# Patient Record
Sex: MALE | Race: BLACK OR AFRICAN AMERICAN | NOT HISPANIC OR LATINO | URBAN - METROPOLITAN AREA
[De-identification: names, ages, dates, MRNs, and addresses within clinical notes are randomized per-mention and may not be internally consistent; named-entity substitution may affect disease eponyms.]

---

## 2021-06-08 ENCOUNTER — INPATIENT (INPATIENT)
Facility: HOSPITAL | Age: 71
LOS: 4 days | Discharge: LEFT BEFORE DISCHARGE | DRG: 563 | End: 2021-06-13
Attending: STUDENT IN AN ORGANIZED HEALTH CARE EDUCATION/TRAINING PROGRAM | Admitting: HOSPITALIST
Payer: MEDICARE

## 2021-06-08 VITALS
HEART RATE: 92 BPM | TEMPERATURE: 98 F | HEIGHT: 68 IN | RESPIRATION RATE: 16 BRPM | WEIGHT: 169.98 LBS | DIASTOLIC BLOOD PRESSURE: 81 MMHG | SYSTOLIC BLOOD PRESSURE: 154 MMHG | OXYGEN SATURATION: 97 %

## 2021-06-08 DIAGNOSIS — D64.9 ANEMIA, UNSPECIFIED: ICD-10-CM

## 2021-06-08 DIAGNOSIS — I10 ESSENTIAL (PRIMARY) HYPERTENSION: ICD-10-CM

## 2021-06-08 DIAGNOSIS — J45.909 UNSPECIFIED ASTHMA, UNCOMPLICATED: ICD-10-CM

## 2021-06-08 DIAGNOSIS — R63.8 OTHER SYMPTOMS AND SIGNS CONCERNING FOOD AND FLUID INTAKE: ICD-10-CM

## 2021-06-08 DIAGNOSIS — F11.20 OPIOID DEPENDENCE, UNCOMPLICATED: ICD-10-CM

## 2021-06-08 DIAGNOSIS — N39.0 URINARY TRACT INFECTION, SITE NOT SPECIFIED: ICD-10-CM

## 2021-06-08 DIAGNOSIS — S42.309A UNSPECIFIED FRACTURE OF SHAFT OF HUMERUS, UNSPECIFIED ARM, INITIAL ENCOUNTER FOR CLOSED FRACTURE: ICD-10-CM

## 2021-06-08 LAB
ALBUMIN SERPL ELPH-MCNC: 3.8 G/DL — SIGNIFICANT CHANGE UP (ref 3.3–5)
ALP SERPL-CCNC: 98 U/L — SIGNIFICANT CHANGE UP (ref 40–120)
ALT FLD-CCNC: 18 U/L — SIGNIFICANT CHANGE UP (ref 10–45)
ANION GAP SERPL CALC-SCNC: 9 MMOL/L — SIGNIFICANT CHANGE UP (ref 5–17)
APPEARANCE UR: CLEAR — SIGNIFICANT CHANGE UP
APTT BLD: 30.2 SEC — SIGNIFICANT CHANGE UP (ref 27.5–35.5)
AST SERPL-CCNC: 26 U/L — SIGNIFICANT CHANGE UP (ref 10–40)
BACTERIA # UR AUTO: PRESENT /HPF
BASOPHILS # BLD AUTO: 0.03 K/UL — SIGNIFICANT CHANGE UP (ref 0–0.2)
BASOPHILS NFR BLD AUTO: 0.4 % — SIGNIFICANT CHANGE UP (ref 0–2)
BILIRUB SERPL-MCNC: 0.4 MG/DL — SIGNIFICANT CHANGE UP (ref 0.2–1.2)
BILIRUB UR-MCNC: NEGATIVE — SIGNIFICANT CHANGE UP
BLD GP AB SCN SERPL QL: NEGATIVE — SIGNIFICANT CHANGE UP
BUN SERPL-MCNC: 17 MG/DL — SIGNIFICANT CHANGE UP (ref 7–23)
CALCIUM SERPL-MCNC: 9.4 MG/DL — SIGNIFICANT CHANGE UP (ref 8.4–10.5)
CHLORIDE SERPL-SCNC: 100 MMOL/L — SIGNIFICANT CHANGE UP (ref 96–108)
CO2 SERPL-SCNC: 29 MMOL/L — SIGNIFICANT CHANGE UP (ref 22–31)
COLOR SPEC: YELLOW — SIGNIFICANT CHANGE UP
CREAT SERPL-MCNC: 0.78 MG/DL — SIGNIFICANT CHANGE UP (ref 0.5–1.3)
DIFF PNL FLD: ABNORMAL
EOSINOPHIL # BLD AUTO: 0.42 K/UL — SIGNIFICANT CHANGE UP (ref 0–0.5)
EOSINOPHIL NFR BLD AUTO: 5 % — SIGNIFICANT CHANGE UP (ref 0–6)
EPI CELLS # UR: SIGNIFICANT CHANGE UP /HPF (ref 0–5)
GLUCOSE SERPL-MCNC: 105 MG/DL — HIGH (ref 70–99)
GLUCOSE UR QL: NEGATIVE — SIGNIFICANT CHANGE UP
HCT VFR BLD CALC: 36.5 % — LOW (ref 39–50)
HGB BLD-MCNC: 11 G/DL — LOW (ref 13–17)
IMM GRANULOCYTES NFR BLD AUTO: 0.8 % — SIGNIFICANT CHANGE UP (ref 0–1.5)
INR BLD: 1.04 — SIGNIFICANT CHANGE UP (ref 0.88–1.16)
KETONES UR-MCNC: NEGATIVE — SIGNIFICANT CHANGE UP
LEUKOCYTE ESTERASE UR-ACNC: ABNORMAL
LYMPHOCYTES # BLD AUTO: 1.32 K/UL — SIGNIFICANT CHANGE UP (ref 1–3.3)
LYMPHOCYTES # BLD AUTO: 15.8 % — SIGNIFICANT CHANGE UP (ref 13–44)
MCHC RBC-ENTMCNC: 27.2 PG — SIGNIFICANT CHANGE UP (ref 27–34)
MCHC RBC-ENTMCNC: 30.1 GM/DL — LOW (ref 32–36)
MCV RBC AUTO: 90.1 FL — SIGNIFICANT CHANGE UP (ref 80–100)
MONOCYTES # BLD AUTO: 0.61 K/UL — SIGNIFICANT CHANGE UP (ref 0–0.9)
MONOCYTES NFR BLD AUTO: 7.3 % — SIGNIFICANT CHANGE UP (ref 2–14)
NEUTROPHILS # BLD AUTO: 5.89 K/UL — SIGNIFICANT CHANGE UP (ref 1.8–7.4)
NEUTROPHILS NFR BLD AUTO: 70.7 % — SIGNIFICANT CHANGE UP (ref 43–77)
NITRITE UR-MCNC: POSITIVE
NRBC # BLD: 0 /100 WBCS — SIGNIFICANT CHANGE UP (ref 0–0)
NT-PROBNP SERPL-SCNC: 255 PG/ML — SIGNIFICANT CHANGE UP (ref 0–300)
PH UR: 6 — SIGNIFICANT CHANGE UP (ref 5–8)
PLATELET # BLD AUTO: 156 K/UL — SIGNIFICANT CHANGE UP (ref 150–400)
POTASSIUM SERPL-MCNC: 4.2 MMOL/L — SIGNIFICANT CHANGE UP (ref 3.5–5.3)
POTASSIUM SERPL-SCNC: 4.2 MMOL/L — SIGNIFICANT CHANGE UP (ref 3.5–5.3)
PROT SERPL-MCNC: 8.3 G/DL — SIGNIFICANT CHANGE UP (ref 6–8.3)
PROT UR-MCNC: NEGATIVE MG/DL — SIGNIFICANT CHANGE UP
PROTHROM AB SERPL-ACNC: 12.4 SEC — SIGNIFICANT CHANGE UP (ref 10.6–13.6)
RBC # BLD: 4.05 M/UL — LOW (ref 4.2–5.8)
RBC # FLD: 14 % — SIGNIFICANT CHANGE UP (ref 10.3–14.5)
RBC CASTS # UR COMP ASSIST: < 5 /HPF — SIGNIFICANT CHANGE UP
RH IG SCN BLD-IMP: POSITIVE — SIGNIFICANT CHANGE UP
SARS-COV-2 RNA SPEC QL NAA+PROBE: SIGNIFICANT CHANGE UP
SODIUM SERPL-SCNC: 138 MMOL/L — SIGNIFICANT CHANGE UP (ref 135–145)
SP GR SPEC: 1.02 — SIGNIFICANT CHANGE UP (ref 1–1.03)
UROBILINOGEN FLD QL: 0.2 E.U./DL — SIGNIFICANT CHANGE UP
WBC # BLD: 8.34 K/UL — SIGNIFICANT CHANGE UP (ref 3.8–10.5)
WBC # FLD AUTO: 8.34 K/UL — SIGNIFICANT CHANGE UP (ref 3.8–10.5)
WBC UR QL: > 10 /HPF

## 2021-06-08 PROCEDURE — 73030 X-RAY EXAM OF SHOULDER: CPT | Mod: 26

## 2021-06-08 PROCEDURE — 73110 X-RAY EXAM OF WRIST: CPT | Mod: 26,LT

## 2021-06-08 PROCEDURE — 73030 X-RAY EXAM OF SHOULDER: CPT | Mod: 26,LT

## 2021-06-08 PROCEDURE — 73090 X-RAY EXAM OF FOREARM: CPT | Mod: 26

## 2021-06-08 PROCEDURE — 73080 X-RAY EXAM OF ELBOW: CPT | Mod: 26,LT

## 2021-06-08 PROCEDURE — 93306 TTE W/DOPPLER COMPLETE: CPT | Mod: 26

## 2021-06-08 PROCEDURE — 73080 X-RAY EXAM OF ELBOW: CPT | Mod: 26

## 2021-06-08 PROCEDURE — 71045 X-RAY EXAM CHEST 1 VIEW: CPT | Mod: 26

## 2021-06-08 PROCEDURE — 99223 1ST HOSP IP/OBS HIGH 75: CPT | Mod: GC

## 2021-06-08 PROCEDURE — 73090 X-RAY EXAM OF FOREARM: CPT | Mod: 26,LT

## 2021-06-08 PROCEDURE — 99285 EMERGENCY DEPT VISIT HI MDM: CPT | Mod: 25

## 2021-06-08 PROCEDURE — 73060 X-RAY EXAM OF HUMERUS: CPT | Mod: 26,LT,76

## 2021-06-08 PROCEDURE — 73060 X-RAY EXAM OF HUMERUS: CPT | Mod: 26

## 2021-06-08 RX ORDER — ACETAMINOPHEN 500 MG
650 TABLET ORAL EVERY 6 HOURS
Refills: 0 | Status: DISCONTINUED | OUTPATIENT
Start: 2021-06-08 | End: 2021-06-13

## 2021-06-08 RX ORDER — TAMSULOSIN HYDROCHLORIDE 0.4 MG/1
0.4 CAPSULE ORAL EVERY 12 HOURS
Refills: 0 | Status: DISCONTINUED | OUTPATIENT
Start: 2021-06-08 | End: 2021-06-13

## 2021-06-08 RX ORDER — ENOXAPARIN SODIUM 100 MG/ML
40 INJECTION SUBCUTANEOUS EVERY 24 HOURS
Refills: 0 | Status: DISCONTINUED | OUTPATIENT
Start: 2021-06-08 | End: 2021-06-13

## 2021-06-08 RX ORDER — SENNA PLUS 8.6 MG/1
2 TABLET ORAL AT BEDTIME
Refills: 0 | Status: DISCONTINUED | OUTPATIENT
Start: 2021-06-08 | End: 2021-06-13

## 2021-06-08 RX ORDER — METHADONE HYDROCHLORIDE 40 MG/1
180 TABLET ORAL EVERY 24 HOURS
Refills: 0 | Status: DISCONTINUED | OUTPATIENT
Start: 2021-06-08 | End: 2021-06-13

## 2021-06-08 RX ORDER — HYDROCHLOROTHIAZIDE 25 MG
25 TABLET ORAL EVERY 24 HOURS
Refills: 0 | Status: DISCONTINUED | OUTPATIENT
Start: 2021-06-08 | End: 2021-06-11

## 2021-06-08 RX ORDER — HYDROMORPHONE HYDROCHLORIDE 2 MG/ML
1 INJECTION INTRAMUSCULAR; INTRAVENOUS; SUBCUTANEOUS ONCE
Refills: 0 | Status: DISCONTINUED | OUTPATIENT
Start: 2021-06-08 | End: 2021-06-08

## 2021-06-08 RX ORDER — MORPHINE SULFATE 50 MG/1
4 CAPSULE, EXTENDED RELEASE ORAL ONCE
Refills: 0 | Status: DISCONTINUED | OUTPATIENT
Start: 2021-06-08 | End: 2021-06-08

## 2021-06-08 RX ORDER — CEFTRIAXONE 500 MG/1
1000 INJECTION, POWDER, FOR SOLUTION INTRAMUSCULAR; INTRAVENOUS EVERY 24 HOURS
Refills: 0 | Status: DISCONTINUED | OUTPATIENT
Start: 2021-06-08 | End: 2021-06-09

## 2021-06-08 RX ADMIN — MORPHINE SULFATE 4 MILLIGRAM(S): 50 CAPSULE, EXTENDED RELEASE ORAL at 00:48

## 2021-06-08 RX ADMIN — Medication 650 MILLIGRAM(S): at 17:27

## 2021-06-08 RX ADMIN — CEFTRIAXONE 100 MILLIGRAM(S): 500 INJECTION, POWDER, FOR SOLUTION INTRAMUSCULAR; INTRAVENOUS at 15:35

## 2021-06-08 RX ADMIN — Medication 1 TABLET(S): at 04:38

## 2021-06-08 RX ADMIN — Medication 650 MILLIGRAM(S): at 18:15

## 2021-06-08 RX ADMIN — ENOXAPARIN SODIUM 40 MILLIGRAM(S): 100 INJECTION SUBCUTANEOUS at 09:25

## 2021-06-08 RX ADMIN — MORPHINE SULFATE 4 MILLIGRAM(S): 50 CAPSULE, EXTENDED RELEASE ORAL at 01:03

## 2021-06-08 RX ADMIN — HYDROMORPHONE HYDROCHLORIDE 1 MILLIGRAM(S): 2 INJECTION INTRAMUSCULAR; INTRAVENOUS; SUBCUTANEOUS at 02:05

## 2021-06-08 RX ADMIN — METHADONE HYDROCHLORIDE 180 MILLIGRAM(S): 40 TABLET ORAL at 09:26

## 2021-06-08 RX ADMIN — TAMSULOSIN HYDROCHLORIDE 0.4 MILLIGRAM(S): 0.4 CAPSULE ORAL at 17:27

## 2021-06-08 RX ADMIN — TAMSULOSIN HYDROCHLORIDE 0.4 MILLIGRAM(S): 0.4 CAPSULE ORAL at 10:38

## 2021-06-08 RX ADMIN — HYDROMORPHONE HYDROCHLORIDE 1 MILLIGRAM(S): 2 INJECTION INTRAMUSCULAR; INTRAVENOUS; SUBCUTANEOUS at 02:20

## 2021-06-08 RX ADMIN — Medication 25 MILLIGRAM(S): at 10:38

## 2021-06-08 RX ADMIN — SENNA PLUS 2 TABLET(S): 8.6 TABLET ORAL at 21:20

## 2021-06-08 NOTE — CONSULT NOTE ADULT - SUBJECTIVE AND OBJECTIVE BOX
Orthopaedic Surgery Consult Note    For Surgeon: Anika    HPI:  Patient is a 71y old Male who presents with a chief complaint of L arm pain after fall in the shower. States he slipped and fell backwards onto the outstretched arm and experienced immediate deformity and pain. Does not relay history of prior L arm trauma but distal humerus hardware present on imaging. Endorses R shoulder dislocation in the past but at present no complaints RUE. States entire L arm is painful from shoulder to hand. Does not recall head trauma, LOC. Denies numbness, tingling RUE.      Allergies    penicillin (Unknown)    Intolerances      PAST MEDICAL & SURGICAL HISTORY:    MEDICATIONS  (STANDING):    MEDICATIONS  (PRN):      Vital Signs Last 24 Hrs  T(C): 36.9 (08 Jun 2021 00:34), Max: 36.9 (08 Jun 2021 00:34)  T(F): 98.5 (08 Jun 2021 00:34), Max: 98.5 (08 Jun 2021 00:34)  HR: 92 (08 Jun 2021 00:34) (92 - 92)  BP: 154/81 (08 Jun 2021 00:34) (154/81 - 154/81)  BP(mean): --  RR: 16 (08 Jun 2021 00:34) (16 - 16)  SpO2: 97% (08 Jun 2021 00:34) (97% - 97%)    Physical Exam:  General: Resting in stretcher, appears agitated, demanding urinal  LUE: Gross deformity of L arm. Arm held at side in flexion and refusing to move elbow or wrist 2/2 pain. TTP diffusely throughout shoulder, arm, elbow, forearm, wrist. SILT throughout. Finger extension, EIP intact. AIN,U motor intact. Radial pulse 2+                          11.0   8.34  )-----------( 156      ( 08 Jun 2021 01:14 )             36.5     06-08    138  |  100  |  17  ----------------------------<  105<H>  4.2   |  29  |  0.78    Ca    9.4      08 Jun 2021 01:14    TPro  8.3  /  Alb  3.8  /  TBili  0.4  /  DBili  x   /  AST  26  /  ALT  18  /  AlkPhos  98  06-08    PT/INR - ( 08 Jun 2021 01:14 )   PT: 12.4 sec;   INR: 1.04          PTT - ( 08 Jun 2021 01:14 )  PTT:30.2 sec  Imaging:     A/P: 71yMale with L humeral shaft fracture s/p fall. No other fractures appreciated on imaging.  - Pain control   - Coaptation splint placed  - NVID pre and post splinting  - Post splint XRs  - Pending discussion w/ Dr. Donaldson    Ortho Pager [phone]   Orthopaedic Surgery Consult Note    For Surgeon: Anika    HPI:  Patient is a 71y old Male who presents with a chief complaint of L arm pain after fall in the shower. States he slipped and fell backwards onto the outstretched arm and experienced immediate deformity and pain. Does not relay history of prior L arm trauma but distal humerus hardware present on imaging. Endorses R shoulder dislocation in the past but at present no complaints RUE. States entire L arm is painful from shoulder to hand. Does not recall head trauma, LOC. Denies numbness, tingling RUE.      Allergies    penicillin (Unknown)    Intolerances      PAST MEDICAL & SURGICAL HISTORY:    MEDICATIONS  (STANDING):    MEDICATIONS  (PRN):      Vital Signs Last 24 Hrs  T(C): 36.9 (08 Jun 2021 00:34), Max: 36.9 (08 Jun 2021 00:34)  T(F): 98.5 (08 Jun 2021 00:34), Max: 98.5 (08 Jun 2021 00:34)  HR: 92 (08 Jun 2021 00:34) (92 - 92)  BP: 154/81 (08 Jun 2021 00:34) (154/81 - 154/81)  BP(mean): --  RR: 16 (08 Jun 2021 00:34) (16 - 16)  SpO2: 97% (08 Jun 2021 00:34) (97% - 97%)    Physical Exam:  General: Resting in stretcher, appears agitated, demanding urinal  LUE: Gross deformity of L arm. Arm held at side in flexion and refusing to move elbow or wrist 2/2 pain. TTP diffusely throughout shoulder, arm, elbow, forearm, wrist. SILT throughout. Finger extension, EIP intact. AIN,U motor intact. Radial pulse 2+                          11.0   8.34  )-----------( 156      ( 08 Jun 2021 01:14 )             36.5     06-08    138  |  100  |  17  ----------------------------<  105<H>  4.2   |  29  |  0.78    Ca    9.4      08 Jun 2021 01:14    TPro  8.3  /  Alb  3.8  /  TBili  0.4  /  DBili  x   /  AST  26  /  ALT  18  /  AlkPhos  98  06-08    PT/INR - ( 08 Jun 2021 01:14 )   PT: 12.4 sec;   INR: 1.04          PTT - ( 08 Jun 2021 01:14 )  PTT:30.2 sec  Imaging:     A/P: 71yMale with L humeral shaft fracture s/p fall. No other fractures appreciated on imaging. Plan for conservative management with coaptation splint and later transition to oneil/functional brace outpatient. No acute operative intervention planned.  - Pain control   - Coaptation splint placed  - NVID pre and post splinting  - Post splint XRs reviewed; fx with acceptable alignment  - Return to ED for new/worsening pain, numbness/tingling/weakness LUE  - F/u outpatient Dr. Donaldson    Ortho Pager [phone]

## 2021-06-08 NOTE — H&P ADULT - PROBLEM SELECTOR PLAN 1
Angulated and displaced comminuted fracture after fall on outstretched hand in the shower on June 7th. Imaging noted for said finding along with greater humeral tuberosity peritendonitis, bankart lesion, distal humeral/condylar fixation plates and screws. Left elbow also with soft tissue swelling but no acute fracture.   - ortho team attempted partial reduction. Per their impression, plan for conservative management with coaptation splint and later transition to oneil/functional brace as an outpatient.   - no acute operative intervention planned  - Per ortho, post splint x-rays reviewd and fracture with acceptable alignment  - currently denies any numbness, tingling, and weakness of LUE  - PRN tylenol (mild)  - PT eval   - f/u outpatient with Dr. Donaldson Angulated and displaced comminuted fracture after fall on outstretched hand in the shower on June 7th. Imaging noted for said finding along with greater humeral tuberosity peritendonitis, bankart lesion, distal humeral/condylar fixation plates and screws. Left elbow also with soft tissue swelling but no acute fracture.   - ortho team attempted partial reduction. Per their impression, plan for conservative management with coaptation splint and later transition to oneil/functional brace as an outpatient.   - no acute operative intervention planned  - Per ortho, post splint x-rays reviewed and fracture with acceptable alignment  - currently denies any numbness, tingling, and weakness of LUE  - PRN tylenol (mild), conservative hold off opioids given he is on methadone  - ice packs   - PT eval   - f/u outpatient with Dr. Donaldson

## 2021-06-08 NOTE — DISCHARGE NOTE PROVIDER - NPI NUMBER (FOR SYSADMIN USE ONLY) :
[[id number]] [[id number]],[UNKNOWN] [[id number]],[UNKNOWN],[[id number]] [[id number]],[UNKNOWN],[[id number]]

## 2021-06-08 NOTE — H&P ADULT - PROBLEM SELECTOR PLAN 8
F: tolerating PO, no IVF  E: replete K<4, Mg<2  N: Dash/TLC  VTE Prophylaxis: Lovenox 40 Q24H  GI: not needed  C: Full Code  D: RMF

## 2021-06-08 NOTE — H&P ADULT - PROBLEM SELECTOR PLAN 5
History of childhood asthma for which he used to take ventolin inhaler as needed and nasal drops. Has not used rescue inhaler within the past year.   - continue to monitor Workup notable for cardiomegaly with pulmonary vascular congestion and bilateral 2+ pitting edema from the feet to mid-calves. Patient unsure of history of heart failure.   - will obtain collateral from wife  - f/u TTE

## 2021-06-08 NOTE — DISCHARGE NOTE PROVIDER - NSDCFUADDAPPT_GEN_ALL_CORE_FT
Please bring your Insurance card, Photo ID and Discharge paperwork to the following appointment:    (1) Please follow up with your Orthopaedic Surgery Provider, Dr. Matt Donaldson at [address] [place] [address] on 06/16/2021 at 1:15pm.    Appointment was scheduled by Ms. ADRIANA Christie, Referral Coordinator.   Please bring your Insurance card, Photo ID and Discharge paperwork to the following appointments:    (1) Please follow up with your Orthopaedic Surgery Provider, Dr. Matt Donaldson at [address] [place] [address] on 06/16/2021 at 1:15pm.    Appointment was scheduled by Ms. ADRIANA Christie, Referral Coordinator.    (2) As per your Primary Care Provider, Ale Franklin, please call the office to schedule an appointment directly with the office or you can do a walk-in as early as one day following your hospital discharge.    Appointment was facilitated by Ms. ADRIANA Christie, Referral Coordinator.   Please bring your Insurance card, Photo ID and Discharge paperwork to the following appointments:    (1) Please follow up with your Orthopaedic Surgery Provider, Dr. Matt Donaldson at [address] [place] [address] on 06/16/2021 at 1:15pm.    Appointment was scheduled by Ms. ADRIANA Christie, Referral Coordinator.    (2) As per your Primary Care Provider, Ale Franklin, please call the office to schedule an appointment directly with the office or you can do a walk-in as early as one day following your hospital discharge. The office is aware of your hospital stay and is expecting you.    Appointment was facilitated by Ms. ADRIANA Christie, Referral Coordinator.   Please bring your Insurance card, Photo ID and Discharge paperwork to the following appointments:    (1) Please follow up with your Orthopaedic Surgery Provider, Dr. Matt Donaldson at [address] [place] [address] on 06/16/2021 at 1:15pm.    Appointment was scheduled by Ms. ADRIANA Christie, Referral Coordinator.    (2) Please follow up with your Cardiology Provider, Nurse Practitioner, Raisa Esteban at [address] [place] [address] on 06/18/2021 at 11:00am.    Appointment was scheduled by Ms. ADRIANA Christie, Referral Coordinator.    (3) As per your Primary Care Provider, Ale Franklin, please call the office to schedule an appointment directly with the office or you can do a walk-in as early as one day following your hospital discharge. The office is aware of your hospital stay and is expecting you.    Appointment was facilitated by Ms. ADRIANA Christie, Referral Coordinator.

## 2021-06-08 NOTE — DISCHARGE NOTE PROVIDER - CARE PROVIDERS DIRECT ADDRESSES
[email or web address],DirectAddress_Unknown [email or web address],DirectAddress_Unknown,DirectAddress_Unknown

## 2021-06-08 NOTE — DISCHARGE NOTE PROVIDER - CARE PROVIDER_API CALL
Matt Donaldson)  Orthopaedic Surgery Surgery  [address] [place] [address]  Phone: [phone]  Fax: [phone]  Follow Up Time: 2 weeks   Matt Donaldson)  Orthopaedic Surgery Surgery  [address] [place] [address]  Phone: [phone]  Fax: [phone]  Scheduled Appointment: 06/16/2021 01:15 PM   Matt Donaldson)  Orthopaedic Surgery Surgery  [address] [address]  Phone: [phone]  Fax: [phone]  Scheduled Appointment: 06/16/2021 01:15 PM    DAGOBERTO Franklin, [facility] [place]  [address] [place] [address], [address]  Phone: [phone]  Fax: (   )    -  Follow Up Time: 2 weeks   Matt Donaldson)  Orthopaedic Surgery Surgery  [address] [address]  Phone: [phone]  Fax: [phone]  Scheduled Appointment: 06/16/2021 01:15 PM    DAGOBERTO Franklin, [facility] [place]  [address] [place] [address], [address]  Phone: [phone]  Fax: (   )    -  Follow Up Time: 2 weeks    NIC NIELSON  Cardiology  Phone: [phone]  Fax: [phone]  Follow Up Time: 2 weeks   Matt Donaldson)  Orthopaedic Surgery Surgery  [address] [address]  Phone: [phone]  Fax: [phone]  Scheduled Appointment: 06/16/2021 01:15 PM    DAGOBERTO Franklin, [facility] [place]  [address], [address]  Phone: [phone]  Fax: (   )    -  Follow Up Time: 2 weeks    Raisa Esteban (NP; RN)  NP in Family Health  [address] [place] [address]  Phone: [phone]  Fax: [phone]  Scheduled Appointment: 06/18/2021 11:00 AM

## 2021-06-08 NOTE — PHYSICAL THERAPY INITIAL EVALUATION ADULT - GENERAL OBSERVATIONS, REHAB EVAL
As per Rn lissa patient cleared for PT/OOB. Received supine + heplock, splint with ace wrap left UE with sling in place, in NAD.

## 2021-06-08 NOTE — H&P ADULT - HISTORY OF PRESENT ILLNESS
71 year old Male with PMHx HTN, childhood asthma, methadone dependence who presents with a chief complaint of L arm pain after fall in the shower. States he was at his sister-in-law's house and used her bathtub around 6-7pm yesterday. There was no railing for support and slipped while showering. Says he fell backwards with his left hand outstretched and experienced immediate 10/10 pain and noticed left upper extremity deformity. Unsure if he hit his head, but denied any loss of consciousness. His wife heard him in pain and entered the bathroom after which EMS was called. Patient possibly a poor historian given that he denied any left arm trauma, but noted to have distal humerus hardware on admission imaging. Does admit to right shoulder dislocation in the distant past that was reduced. At this moment, his LUE pain is 9/10 and diffuse but denies any numbness or tingling. Currently denies any other MSK related symptoms aside from the left upper extremity. Of note, he denies any dysuria or urethral discharge but admits to polyuria for the past one week. On further ROS, denies fevers, chills, N/V/D/C, CP, SOB, abdominal pain, leg swelling.     ED Course  Vitals: 98.5F (oral), HR 92, /81, 97% on RA, RR 16  Labs: notable for Hgb 11.0/Hct 36.5; UA - positive nitrites and small LE, trace blood, >10 WBC, +bacteria;   Imagin) XR humerus and shoulder, left - Humeral shaft angulated displaced comminuted fracture. Chronic rotator cuff injury. Shoulder and AC joint degenerative changes, greater humeral tuberosity peritendinitis. Bankart lesion. Distal humeral/condylar fixation plates and screws  2) XR forearm, left - Elbow soft tissue swelling. No acute fracture.. Wrist and elbow degenerative arthritis. Distal humeral/condylar fixation plates and screws  3) CXR - Cardiomegaly. Pulmonary vascular congestion. Scoliosis Left humeral shaft fracture, fixation plates and screws. Bilateral shoulder degenerative changes. Partially visualized right humeral neck old traumatic changes.  Management: dilaudid 1mg IVP x1, morphine 4mg IVP x1, bactrim DS PO x1 71 year old Male with PMHx HTN, childhood asthma, methadone dependence who presents with a chief complaint of L arm pain after fall in the shower. States he was at his sister-in-law's house and used her bathtub around 6-7pm yesterday. There was no railing for support and slipped while showering. Says he fell backwards with his left hand outstretched and experienced immediate 10/10 pain and noticed left upper extremity deformity. Unsure if he hit his head, but denied any loss of consciousness. His wife heard him in pain and entered the bathroom after which EMS was called. Patient possibly a poor historian given that he denied any left arm trauma, but noted to have distal humerus hardware on admission imaging. Does admit to right shoulder dislocation in the distant past that was reduced. At this moment, his LUE pain is 9/10 and diffuse but denies any numbness or tingling. Currently denies any other MSK related symptoms aside from the left upper extremity. Of note, he denies any dysuria or urethral discharge but admits to polyuria for the past one week. On further ROS, denies fevers, chills, N/V/D/C, CP, SOB, abdominal pain, leg swelling (although exam notable for swelling).    ED Course  Vitals: 98.5F (oral), HR 92, /81, 97% on RA, RR 16  Labs: notable for Hgb 11.0/Hct 36.5; UA - positive nitrites and small LE, trace blood, >10 WBC, +bacteria;   Imagin) XR humerus and shoulder, left - Humeral shaft angulated displaced comminuted fracture. Chronic rotator cuff injury. Shoulder and AC joint degenerative changes, greater humeral tuberosity peritendinitis. Bankart lesion. Distal humeral/condylar fixation plates and screws  2) XR forearm, left - Elbow soft tissue swelling. No acute fracture.. Wrist and elbow degenerative arthritis. Distal humeral/condylar fixation plates and screws  3) CXR - Cardiomegaly. Pulmonary vascular congestion. Scoliosis Left humeral shaft fracture, fixation plates and screws. Bilateral shoulder degenerative changes. Partially visualized right humeral neck old traumatic changes.  Management: dilaudid 1mg IVP x1, morphine 4mg IVP x1, bactrim DS PO x1

## 2021-06-08 NOTE — H&P ADULT - ATTENDING COMMENTS
Patient discussed with resident team and plan of care reviewed. I have personally reviewed all pertinent labs and imaging and performed an independent history and physical. Resident note personally reviewed, and I agree with above resident note with the following additions:    71YOM with history of essential HTN, asthma, chronic opiate dependence on methadone admitted after mechanical fall leading to left humeral fracture; also noted on admission to have UTI with symptoms of urinary frequency. CXR on admission also notable for cardiomegaly and pulmonary vascular congestion - no known diagnosis of heart failure, and not on any heart failure medications.     Continue pain medications (maximize non-opiates if pain poorly controlled, given on methadone), will have PT see patient and give ceftriaxone for UTI. Add on BNP and TTE to evaluate for heart failure.

## 2021-06-08 NOTE — PHYSICAL THERAPY INITIAL EVALUATION ADULT - ACTIVE RANGE OF MOTION EXAMINATION, REHAB EVAL
except decreased shoulder flexion. Left shoulder/elbow NT due to fracture/splint. Left hand WFL/Right UE Active ROM was WNL (within normal limits)/bilateral  lower extremity Active ROM was WFL (within functional limits)

## 2021-06-08 NOTE — PHYSICAL THERAPY INITIAL EVALUATION ADULT - MANUAL MUSCLE TESTING RESULTS, REHAB EVAL
Right UE 5/5 except right shoulder flexion 3-/5. Left shoulder/elbow NT due to fracture. Left  strength grossly 4/5. Both LE at least 3+/5 throughout

## 2021-06-08 NOTE — DISCHARGE NOTE PROVIDER - HOSPITAL COURSE
#Discharge: do not delete    71 year old Male with PMHx HTN and childhood asthma who presents with a chief complaint of L arm pain after fall in the shower. Found to have a left humeral angulated and diplaced comminuted fracture. Now s/p partial reduction by orthopedic surgery team with plan for conservative management pending HEVER placement.     Problem List/Main Diagnoses (system-based):     #L humerus fracture     #UTI    #Diastolic HF    Inpatient treatment course:   New medications:   Labs to be followed outpatient:   Exam to be followed outpatient:

## 2021-06-08 NOTE — ED PROVIDER NOTE - CARE PLAN
Principal Discharge DX:	Other closed displaced fracture of distal end of left humerus, initial encounter  Secondary Diagnosis:	UTI (urinary tract infection)

## 2021-06-08 NOTE — H&P ADULT - NSHPPHYSICALEXAM_GEN_ALL_CORE
Constitutional: NAD, resting comfortably in bed  Head: NC/AT  Eyes: PERRL, EOMI, anicteric sclera  ENT: no nasal discharge; uvula midline, no oropharyngeal erythema or exudates; slightly dry mucus membranes  Neck: supple; no JVD   Respiratory: CTA B/L; no W/R/R  Cardiac: RRR, +S1, S2, no murmurs noted; PMI displaced   Gastrointestinal: soft, NT/ND; no rebound or guarding; +BSx4  Genitourinary: +suprapubic tenderness to deep palpation  Extremities: WWP, no clubbing or cyanosis; 2+ bilateral pitting edema from feet to mid-calves,   Musculoskeletal: NROM x4; LUE wrapped in cast and sling, partially in flexion, refusing to move secondary to pain. Able to flex and extend fingers without numbness or tingling, distal pulses palpable.    Vascular: 2+ radial, femoral, DP/PT pulses B/L  Dermatologic: skin warm, crusted at the feet; no rashes, wounds, or scars  Neurologic: AAOx3; CNII-XII grossly intact; no focal deficits  Psychiatric: affect and characteristics of appearance, verbalizations, behaviors are appropriate

## 2021-06-08 NOTE — DISCHARGE NOTE PROVIDER - NSDCCPCAREPLAN_GEN_ALL_CORE_FT
PRINCIPAL DISCHARGE DIAGNOSIS  Diagnosis: Other closed displaced fracture of distal end of left humerus, initial encounter  Assessment and Plan of Treatment:       SECONDARY DISCHARGE DIAGNOSES  Diagnosis: UTI (urinary tract infection)  Assessment and Plan of Treatment:     Diagnosis: Heart failure  Assessment and Plan of Treatment:

## 2021-06-08 NOTE — H&P ADULT - NSHPLABSRESULTS_GEN_ALL_CORE
.  LABS                        11.0   8.34  )-----------( 156      ( 2021 01:14 )             36.5     06-08    138  |  100  |  17  ----------------------------<  105<H>  4.2   |  29  |  0.78    Ca    9.4      2021 01:14    TPro  8.3  /  Alb  3.8  /  TBili  0.4  /  DBili  x   /  AST  26  /  ALT  18  /  AlkPhos  98  06-08    PT/INR - ( 2021 01:14 )   PT: 12.4 sec;   INR: 1.04          PTT - ( 2021 01:14 )  PTT:30.2 sec  Urinalysis Basic - ( 2021 03:34 )    Color: Yellow / Appearance: Clear / S.025 / pH: x  Gluc: x / Ketone: NEGATIVE  / Bili: Negative / Urobili: 0.2 E.U./dL   Blood: x / Protein: NEGATIVE mg/dL / Nitrite: POSITIVE   Leuk Esterase: Small / RBC: < 5 /HPF / WBC > 10 /HPF   Sq Epi: x / Non Sq Epi: 0-5 /HPF / Bacteria: Present /HPF            RADIOLOGY & ADDITIONAL TESTS: Reviewed

## 2021-06-08 NOTE — ED ADULT NURSE NOTE - NSIMPLEMENTINTERV_GEN_ALL_ED
Implemented All Fall Risk Interventions:  [place] to call system. Call bell, personal items and telephone within reach. Instruct patient to call for assistance. Room bathroom lighting operational. Non-slip footwear when patient is off stretcher. Physically safe environment: no spills, clutter or unnecessary equipment. Stretcher in lowest position, wheels locked, appropriate side rails in place. Provide visual cue, wrist band, yellow gown, etc. Monitor gait and stability. Monitor for mental status changes and reorient to person, place, and time. Review medications for side effects contributing to fall risk. Reinforce activity limits and safety measures with patient and family.

## 2021-06-08 NOTE — ED PROVIDER NOTE - SHIFT CHANGE DETAILS
Left two messages, patient did not return call.  
Lmtc, 1/18/19    Spouse left a VM earlier stating that she has had nausea for one week.   
She didn't call back last week. Her  said that she was having nausea, she didn't mention in the office visit did she?  
She has had nausea in the past but did not mention it in the past can we call her today and investigate a little further  
lmtc 1/22/19  
71M prior L elbow ORIF c/o L disal arm pain/deformity s/p mechanical fall. found to have acute displaced L distal perihardware humerus fx on xray. pt w/ full capacity refusing ct head. ortho consulted.    dispo: pending ortho reccs

## 2021-06-08 NOTE — ED PROVIDER NOTE - OBJECTIVE STATEMENT
70 y/o M pt with PMHx of asthma, HTN, and on a Methadone program x 15yrs presents to ED s/p slip and fall in the shower tower. Pt states his L hand wound up behind his back and he fell onto his back. He currently relates pain from his L shoulder radiating down his L arm. Pt relates hitting his head, but denies LOC, neck pain, or any other complaints. Pt is not on any blood thinners. Pt is R handed. 70 y/o M pt with PMHx of asthma, HTN, and on a Methadone program x 15yrs presents to ED s/p slip and fall in the shower tonight. Pt states his L hand wound up behind his back and he fell onto his back. He currently relates pain from his L shoulder radiating down his L arm. Pt relates hitting his head, but denies LOC, neck pain, or any other complaints. Pt is not on any blood thinners. Pt is R handed. Denies ha, dizziness, n/v, numbness, tingling.

## 2021-06-08 NOTE — H&P ADULT - PROBLEM SELECTOR PLAN 4
Says he is on methadone 180mg for which he receives his dose at the [facility] on [address].  - will call clinic in AM Says he is on methadone 180mg for which he receives his dose at the [facility] on [address].  - Confirmed dose with clinic (tel: [phone])

## 2021-06-08 NOTE — H&P ADULT - PROBLEM SELECTOR PLAN 6
Hgb 11.0/Hct 36.5 on admission with unclear baseline. Denies any s/s bleeding including hemoptysis, hematuria, dark tarry stools, melena.   - active T&S  - transfuse for Hgb < 7 History of childhood asthma for which he used to take ventolin inhaler as needed and nasal drops. Has not used rescue inhaler within the past year.   - continue to monitor

## 2021-06-08 NOTE — ED ADULT TRIAGE NOTE - CHIEF COMPLAINT QUOTE
Pt presents via EMS for evaluation of left arm pain and deformity to LUE s/p slip and fall in shower PTA. Pt arrives with EMS sling in place, limited ROM, +deformity.

## 2021-06-08 NOTE — H&P ADULT - PROBLEM SELECTOR PLAN 7
F: tolerating PO, no IVF  E: replete K<4, Mg<2  N: Dash/TLC  VTE Prophylaxis: Lovenox 40 Q24H  GI: not needed  C: Full Code  D: RMF Hgb 11.0/Hct 36.5 on admission with unclear baseline. Denies any s/s bleeding including hemoptysis, hematuria, dark tarry stools, melena.   - active T&S  - transfuse for Hgb < 7

## 2021-06-08 NOTE — H&P ADULT - NSHPSOCIALHISTORY_GEN_ALL_CORE
Ambulates with a cane, lives in the [place] with his wife, unemployed. Drinks 2-3 alcoholic beverages per week. Denies recreational drug use. Denies alcohol consumption. Establishes acare at a clin in [address] [place] [address]. Ambulates with a cane, lives in the [place] with his wife, unemployed. Drinks 2-3 alcoholic beverages per week. Denies recreational drug use. Denies alcohol consumption. Establishes a care at a [facility] in [address] [place] [address].

## 2021-06-08 NOTE — PHYSICAL THERAPY INITIAL EVALUATION ADULT - PASSIVE RANGE OF MOTION EXAMINATION, REHAB EVAL
Left shoulder/elbow nT due to fracutre. Left hand WFL/Right UE Passive ROM was WNL (within normal limits)/bilateral lower extremity Passive ROM was WNL

## 2021-06-08 NOTE — ED ADULT NURSE REASSESSMENT NOTE - NS ED NURSE REASSESS COMMENT FT1
DAGOBERTO Woody at bedside for evaluation
Ortho at bedside
called XRT via [employer] to obtain additional XR, as ordered
pt. back from XR, urine sample was obtained and sent, resting quietly, nad
pt. back from XR, without incident or change, pharmacy updated, awaiting dispo.
pt. back from radiology, per CT tech, pt. refused CT head, DAGOBERTO Woody aware, and back at bedside speaking with pt.
pt. resting quietly, nad, d/w provider, states awaiting ortho to look at final film for dispo.
pt. resting quietly, resps. even/unlabored, nad, assessment on-going, pending recs. per ortho
pt. medicated for continued pain as noted, ortho remains at bedside, for splinting, assessment on-going
pt. was medicated as noted, kane. well, assessment on-going, to radiology, accompanied by SSA

## 2021-06-08 NOTE — H&P ADULT - PROBLEM SELECTOR PLAN 3
BP on admission 154/81. Possibly elevated from either underlying diagnosis and/or pain   - med rec in AM (receives medications from [address]) BP on admission 154/81. Possibly elevated from either underlying diagnosis and/or pain   - Per med rec, takes procardia XL 60 mg daily  - Given peripheral edema will dose for HCTZ 25 mg PO daily for now    #BPH  - c/w home tamsulosin 0.4mg q12h

## 2021-06-08 NOTE — PHYSICAL THERAPY INITIAL EVALUATION ADULT - PERTINENT HX OF CURRENT PROBLEM, REHAB EVAL
71 year old Male with PMHx HTN, childhood asthma, methadone dependence who presents with a chief complaint of L arm pain after fall in the shower resulitng in left humerus fracture

## 2021-06-08 NOTE — ED PROVIDER NOTE - CLINICAL SUMMARY MEDICAL DECISION MAKING FREE TEXT BOX
72 y/o M pt with PMHx of asthma and HTN presents to ED with L shoulder and elbow pain s/p slip and fall in the shower today. Positive head strike, but no LOC, and pt not on any blood thinners. On exam, pt with obvious deformity to L elbow with swelling and tenderness, and tenderness to L shoulder, but sensations intact. Will XR L shoulder, humerus, elbow, and forearm, CT head, and give pain control.

## 2021-06-08 NOTE — H&P ADULT - NSICDXFAMHXPERTINENTNEGATIVE_GEN_A_CORE_FT
cancer, coronary disease, diabetes. Patient reports mother had heart valve issues and  during surgery for heart valve.

## 2021-06-08 NOTE — DISCHARGE NOTE PROVIDER - PROVIDER TOKENS
PROVIDER:[TOKEN:[[id number]],FOLLOWUP:[2 weeks]] PROVIDER:[TOKEN:[[id number]],SCHEDULEDAPPT:[06/16/2021],SCHEDULEDAPPTTIME:[01:15 PM]] PROVIDER:[TOKEN:[[id number]],SCHEDULEDAPPT:[06/16/2021],SCHEDULEDAPPTTIME:[01:15 PM]],FREE:[LAST:[DAGOBERTO Franklin],FIRST:[Ale],PHONE:[phone]],FAX:[(   )    -],ADDRESS:[[facility] at [address], [facility]],FOLLOWUP:[2 weeks]] PROVIDER:[TOKEN:[[id number]],SCHEDULEDAPPT:[06/16/2021],SCHEDULEDAPPTTIME:[01:15 PM]],FREE:[LAST:[DAGOBERTO Franklin],FIRST:[Ale],PHONE:[phone]],FAX:[(   )    -],ADDRESS:[[facility] at [address], [address]],FOLLOWUP:[2 weeks]],PROVIDER:[TOKEN:[[id number]],FOLLOWUP:[2 weeks]] PROVIDER:[TOKEN:[[id number]],SCHEDULEDAPPT:[06/16/2021],SCHEDULEDAPPTTIME:[01:15 PM]],FREE:[LAST:[DAGOBERTO Franklin],FIRST:[Ale],PHONE:[phone]:[(   )    -],ADDRESS:[[facility] at [address], [facility]],FOLLOWUP:[2 weeks]],PROVIDER:[TOKEN:[[id number]],SCHEDULEDAPPT:[06/18/2021],SCHEDULEDAPPTTIME:[11:00 AM]]

## 2021-06-08 NOTE — DISCHARGE NOTE PROVIDER - NSDCMRMEDTOKEN_GEN_ALL_CORE_FT
Claritin 10 mg oral tablet: 1 tab(s) orally once a day  methadone: 180 milligram(s) orally once a day  omeprazole 10 mg oral delayed release capsule: 1 cap(s) orally once a day  Procardia XL 60 mg oral tablet, extended release: 1 tab(s) orally once a day  Esqueda Brace :   senna oral tablet: 2 tab(s) orally once a day  tamsulosin 0.4 mg oral capsule: orally every 12 hours   Claritin 10 mg oral tablet: 1 tab(s) orally once a day  methadone: 180 milligram(s) orally once a day  omeprazole 20 mg oral delayed release capsule: 1 cap(s) orally once a day  Procardia XL 60 mg oral tablet, extended release: 1 tab(s) orally once a day  Esqueda Brace :   senna oral tablet: 2 tab(s) orally once a day  tamsulosin 0.4 mg oral capsule: orally every 12 hours  Vitamin D3: 92953 unit(s) orally once a week

## 2021-06-08 NOTE — H&P ADULT - ASSESSMENT
71 year old Male with PMHx HTN and childhood asthma who presents with a chief complaint of L arm pain after fall in the shower. Found to have a left humeral angulated and diplaced comminuted fracture. Now s/p partial reduction by orthopedic surgery team with plan for conservative management.

## 2021-06-08 NOTE — ED PROVIDER NOTE - PROGRESS NOTE DETAILS
pt w/ full capacity. pt declining ct head at this time. understands risk of missed/worsening disease. questions answered. pt seen by ortho, splint place, awaiting recs from attending. Likely non-operative ortho will not operate. Will admit pt to medicine for pain control and rehab as pt unable to walk and care for self with fracture Antonette: received s/o. Splinted by ortho. however, pt unable to care for himself. will admit medicine for further care.

## 2021-06-09 LAB
ALBUMIN SERPL ELPH-MCNC: 3 G/DL — LOW (ref 3.3–5)
ALP SERPL-CCNC: 83 U/L — SIGNIFICANT CHANGE UP (ref 40–120)
ALT FLD-CCNC: 12 U/L — SIGNIFICANT CHANGE UP (ref 10–45)
ANION GAP SERPL CALC-SCNC: 9 MMOL/L — SIGNIFICANT CHANGE UP (ref 5–17)
AST SERPL-CCNC: 19 U/L — SIGNIFICANT CHANGE UP (ref 10–40)
BILIRUB SERPL-MCNC: 0.4 MG/DL — SIGNIFICANT CHANGE UP (ref 0.2–1.2)
BUN SERPL-MCNC: 11 MG/DL — SIGNIFICANT CHANGE UP (ref 7–23)
CALCIUM SERPL-MCNC: 8.8 MG/DL — SIGNIFICANT CHANGE UP (ref 8.4–10.5)
CHLORIDE SERPL-SCNC: 100 MMOL/L — SIGNIFICANT CHANGE UP (ref 96–108)
CO2 SERPL-SCNC: 29 MMOL/L — SIGNIFICANT CHANGE UP (ref 22–31)
COVID-19 SPIKE DOMAIN AB INTERP: POSITIVE
COVID-19 SPIKE DOMAIN ANTIBODY RESULT: 0.88 U/ML — HIGH
CREAT SERPL-MCNC: 0.73 MG/DL — SIGNIFICANT CHANGE UP (ref 0.5–1.3)
GLUCOSE SERPL-MCNC: 70 MG/DL — SIGNIFICANT CHANGE UP (ref 70–99)
HCT VFR BLD CALC: 30.5 % — LOW (ref 39–50)
HCT VFR BLD CALC: 31.8 % — LOW (ref 39–50)
HCV AB S/CO SERPL IA: 45.93 S/CO — HIGH
HCV AB SERPL-IMP: REACTIVE
HGB BLD-MCNC: 9.4 G/DL — LOW (ref 13–17)
HGB BLD-MCNC: 9.9 G/DL — LOW (ref 13–17)
MAGNESIUM SERPL-MCNC: 1.8 MG/DL — SIGNIFICANT CHANGE UP (ref 1.6–2.6)
MCHC RBC-ENTMCNC: 26.8 PG — LOW (ref 27–34)
MCHC RBC-ENTMCNC: 27 PG — SIGNIFICANT CHANGE UP (ref 27–34)
MCHC RBC-ENTMCNC: 30.8 GM/DL — LOW (ref 32–36)
MCHC RBC-ENTMCNC: 31.1 GM/DL — LOW (ref 32–36)
MCV RBC AUTO: 86.9 FL — SIGNIFICANT CHANGE UP (ref 80–100)
MCV RBC AUTO: 86.9 FL — SIGNIFICANT CHANGE UP (ref 80–100)
NRBC # BLD: 0 /100 WBCS — SIGNIFICANT CHANGE UP (ref 0–0)
NRBC # BLD: 0 /100 WBCS — SIGNIFICANT CHANGE UP (ref 0–0)
PHOSPHATE SERPL-MCNC: 3.3 MG/DL — SIGNIFICANT CHANGE UP (ref 2.5–4.5)
PLATELET # BLD AUTO: 142 K/UL — LOW (ref 150–400)
PLATELET # BLD AUTO: 146 K/UL — LOW (ref 150–400)
POTASSIUM SERPL-MCNC: 4 MMOL/L — SIGNIFICANT CHANGE UP (ref 3.5–5.3)
POTASSIUM SERPL-SCNC: 4 MMOL/L — SIGNIFICANT CHANGE UP (ref 3.5–5.3)
PROT SERPL-MCNC: 7 G/DL — SIGNIFICANT CHANGE UP (ref 6–8.3)
RBC # BLD: 3.51 M/UL — LOW (ref 4.2–5.8)
RBC # BLD: 3.66 M/UL — LOW (ref 4.2–5.8)
RBC # FLD: 14 % — SIGNIFICANT CHANGE UP (ref 10.3–14.5)
RBC # FLD: 14 % — SIGNIFICANT CHANGE UP (ref 10.3–14.5)
SARS-COV-2 IGG+IGM SERPL QL IA: 0.88 U/ML — HIGH
SARS-COV-2 IGG+IGM SERPL QL IA: POSITIVE
SODIUM SERPL-SCNC: 138 MMOL/L — SIGNIFICANT CHANGE UP (ref 135–145)
WBC # BLD: 7.14 K/UL — SIGNIFICANT CHANGE UP (ref 3.8–10.5)
WBC # BLD: 8.68 K/UL — SIGNIFICANT CHANGE UP (ref 3.8–10.5)
WBC # FLD AUTO: 7.14 K/UL — SIGNIFICANT CHANGE UP (ref 3.8–10.5)
WBC # FLD AUTO: 8.68 K/UL — SIGNIFICANT CHANGE UP (ref 3.8–10.5)

## 2021-06-09 PROCEDURE — 99233 SBSQ HOSP IP/OBS HIGH 50: CPT | Mod: GC

## 2021-06-09 RX ORDER — OMEPRAZOLE 10 MG/1
1 CAPSULE, DELAYED RELEASE ORAL
Qty: 0 | Refills: 0 | DISCHARGE

## 2021-06-09 RX ORDER — KETOROLAC TROMETHAMINE 30 MG/ML
15 SYRINGE (ML) INJECTION ONCE
Refills: 0 | Status: DISCONTINUED | OUTPATIENT
Start: 2021-06-09 | End: 2021-06-09

## 2021-06-09 RX ORDER — MORPHINE SULFATE 50 MG/1
2 CAPSULE, EXTENDED RELEASE ORAL ONCE
Refills: 0 | Status: DISCONTINUED | OUTPATIENT
Start: 2021-06-09 | End: 2021-06-09

## 2021-06-09 RX ORDER — SENNA PLUS 8.6 MG/1
2 TABLET ORAL
Qty: 0 | Refills: 0 | DISCHARGE

## 2021-06-09 RX ORDER — TAMSULOSIN HYDROCHLORIDE 0.4 MG/1
0 CAPSULE ORAL
Qty: 0 | Refills: 0 | DISCHARGE

## 2021-06-09 RX ORDER — MAGNESIUM SULFATE 500 MG/ML
1 VIAL (ML) INJECTION ONCE
Refills: 0 | Status: COMPLETED | OUTPATIENT
Start: 2021-06-09 | End: 2021-06-09

## 2021-06-09 RX ORDER — METHADONE HYDROCHLORIDE 40 MG/1
180 TABLET ORAL
Qty: 0 | Refills: 0 | DISCHARGE

## 2021-06-09 RX ORDER — NIFEDIPINE 30 MG
60 TABLET, EXTENDED RELEASE 24 HR ORAL DAILY
Refills: 0 | Status: DISCONTINUED | OUTPATIENT
Start: 2021-06-09 | End: 2021-06-13

## 2021-06-09 RX ORDER — CHOLECALCIFEROL (VITAMIN D3) 125 MCG
50000 CAPSULE ORAL
Qty: 0 | Refills: 0 | DISCHARGE

## 2021-06-09 RX ORDER — LORATADINE 10 MG/1
1 TABLET ORAL
Qty: 0 | Refills: 0 | DISCHARGE

## 2021-06-09 RX ORDER — NIFEDIPINE 30 MG
1 TABLET, EXTENDED RELEASE 24 HR ORAL
Qty: 0 | Refills: 0 | DISCHARGE

## 2021-06-09 RX ADMIN — TAMSULOSIN HYDROCHLORIDE 0.4 MILLIGRAM(S): 0.4 CAPSULE ORAL at 05:56

## 2021-06-09 RX ADMIN — ENOXAPARIN SODIUM 40 MILLIGRAM(S): 100 INJECTION SUBCUTANEOUS at 09:55

## 2021-06-09 RX ADMIN — Medication 15 MILLIGRAM(S): at 18:30

## 2021-06-09 RX ADMIN — Medication 100 GRAM(S): at 09:55

## 2021-06-09 RX ADMIN — SENNA PLUS 2 TABLET(S): 8.6 TABLET ORAL at 21:07

## 2021-06-09 RX ADMIN — Medication 15 MILLIGRAM(S): at 18:45

## 2021-06-09 RX ADMIN — Medication 1 TABLET(S): at 19:16

## 2021-06-09 RX ADMIN — METHADONE HYDROCHLORIDE 180 MILLIGRAM(S): 40 TABLET ORAL at 09:55

## 2021-06-09 RX ADMIN — TAMSULOSIN HYDROCHLORIDE 0.4 MILLIGRAM(S): 0.4 CAPSULE ORAL at 17:23

## 2021-06-09 RX ADMIN — Medication 25 MILLIGRAM(S): at 09:56

## 2021-06-09 RX ADMIN — CEFTRIAXONE 100 MILLIGRAM(S): 500 INJECTION, POWDER, FOR SOLUTION INTRAMUSCULAR; INTRAVENOUS at 17:24

## 2021-06-09 RX ADMIN — Medication 60 MILLIGRAM(S): at 09:55

## 2021-06-09 NOTE — CONSULT NOTE ADULT - ASSESSMENT
per Internal Medicine    71 year old Male with PMHx HTN and childhood asthma who presents with a chief complaint of L arm pain after fall in the shower. Found to have a left humeral angulated and diplaced comminuted fracture. Now s/p partial reduction by orthopedic surgery team with plan for conservative management pending HEVER placement.     Problem/Plan - 1:  ·  Problem: Humerus fracture.  Plan: Angulated and displaced comminuted fracture after fall on outstretched hand in the shower on June 7th. Imaging noted for said finding along with greater humeral tuberosity peritendonitis, bankart lesion, distal humeral/condylar fixation plates and screws. Left elbow also with soft tissue swelling but no acute fracture.   - ortho team attempted partial reduction. Per their impression, plan for conservative management with coaptation splint and later transition to oneil/functional brace as an outpatient. Need to obtain brace for d/c?  - no acute operative intervention planned  - Per ortho, post splint x-rays reviewed and fracture with acceptable alignment  - currently denies any numbness, tingling, and weakness of LUE  - PRN tylenol (mild), conservative hold off opioids given he is on methadone  - ice packs   - PT eval   - f/u outpatient with Dr. Donaldson.     Problem/Plan - 2:  ·  Problem: UTI (urinary tract infection).  Plan: Urinalysis positive for nitrites, small LE, >10 WBC, + bacteria. Patient denies any dysuria or urethral discharge, but admits to polyuria within the past week. Denies any fever, chills, flank pain, confusion.   - s/p bactrim DS PO x1   - c/w CTX 1g daily x5 days (6/8 - ).     Problem/Plan - 3:  ·  Problem: HTN (hypertension).  Plan: BP on admission 154/81. Possibly elevated from either underlying diagnosis and/or pain   - Added procardia 60mg daily as well.   - Given peripheral edema will dose for HCTZ 25 mg PO daily for now    #BPH  - c/w home tamsulosin 0.4mg q12h.     Problem/Plan - 4:  ·  Problem: Methadone dependence.  Plan: Says he is on methadone 180mg for which he receives his dose at the [facility] on [address].  - Confirmed dose with clinic (tel: [phone]).     Problem/Plan - 5:  ·  Problem: R/O Heart failure.  Plan: Workup notable for cardiomegaly with pulmonary vascular congestion and bilateral 2+ pitting edema from the feet to mid-calves. Patient unsure of history of heart failure.   - TTE suggestive of grade 1 diastolic dysfunction. normal LV/RV function. severe dilated LA.     Problem/Plan - 6:  Problem: Asthma. Plan: History of childhood asthma for which he used to take ventolin inhaler as needed and nasal drops. Has not used rescue inhaler within the past year.   - continue to monitor.    Problem/Plan - 7:  ·  Problem: Anemia.  Plan: Hgb 11.0/Hct 36.5 on admission with unclear baseline. Denies any s/s bleeding including hemoptysis, hematuria, dark tarry stools, melena.   - active T&S  - transfuse for Hgb < 7.     Problem/Plan - 8:  ·  Problem: Nutrition, metabolism, and development symptoms.  Plan: F: tolerating PO, no IVF  E: replete K<4, Mg<2  N: Dash/TLC  VTE Prophylaxis: Lovenox 40 Q24H  GI: not needed  C: Full Code  D: RMF.

## 2021-06-09 NOTE — OCCUPATIONAL THERAPY INITIAL EVALUATION ADULT - LIVES WITH, PROFILE
Pt lives with wife in apt with ramp access + elevator access. Pt at baseline is ind for ADLs and uses straight cane/spouse Pt lives with wife in apt with ramp access + elevator access. Pt at baseline is ind for ADLs and uses straight cane./spouse

## 2021-06-09 NOTE — CONSULT NOTE ADULT - SUBJECTIVE AND OBJECTIVE BOX
Patient is a 71y old  Male who presents with a chief complaint of L humerus fracture (2021 12:46)       HPI:  71 year old Male with PMHx HTN, childhood asthma, methadone dependence who presents with a chief complaint of L arm pain after fall in the shower. States he was at his sister-in-law's house and used her bathtub around 6-7pm yesterday. There was no railing for support and slipped while showering. Says he fell backwards with his left hand outstretched and experienced immediate 10/10 pain and noticed left upper extremity deformity. Unsure if he hit his head, but denied any loss of consciousness. His wife heard him in pain and entered the bathroom after which EMS was called. Patient possibly a poor historian given that he denied any left arm trauma, but noted to have distal humerus hardware on admission imaging. Does admit to right shoulder dislocation in the distant past that was reduced. At this moment, his LUE pain is 9/10 and diffuse but denies any numbness or tingling. Currently denies any other MSK related symptoms aside from the left upper extremity. Of note, he denies any dysuria or urethral discharge but admits to polyuria for the past one week. On further ROS, denies fevers, chills, N/V/D/C, CP, SOB, abdominal pain, leg swelling (although exam notable for swelling).    ED Course  Vitals: 98.5F (oral), HR 92, /81, 97% on RA, RR 16  Labs: notable for Hgb 11.0/Hct 36.5; UA - positive nitrites and small LE, trace blood, >10 WBC, +bacteria;   Imagin) XR humerus and shoulder, left - Humeral shaft angulated displaced comminuted fracture. Chronic rotator cuff injury. Shoulder and AC joint degenerative changes, greater humeral tuberosity peritendinitis. Bankart lesion. Distal humeral/condylar fixation plates and screws  2) XR forearm, left - Elbow soft tissue swelling. No acute fracture.. Wrist and elbow degenerative arthritis. Distal humeral/condylar fixation plates and screws  3) CXR - Cardiomegaly. Pulmonary vascular congestion. Scoliosis Left humeral shaft fracture, fixation plates and screws. Bilateral shoulder degenerative changes. Partially visualized right humeral neck old traumatic changes.  Management: dilaudid 1mg IVP x1, morphine 4mg IVP x1, bactrim DS PO x1 (2021 08:16)      PAST MEDICAL & SURGICAL HISTORY:  Asthma    HTN (hypertension)        MEDICATIONS  (STANDING):  cefTRIAXone   IVPB 1000 milliGRAM(s) IV Intermittent every 24 hours  enoxaparin Injectable 40 milliGRAM(s) SubCutaneous every 24 hours  hydrochlorothiazide 25 milliGRAM(s) Oral every 24 hours  methadone    Tablet 180 milliGRAM(s) Oral every 24 hours  NIFEdipine XL 60 milliGRAM(s) Oral daily  senna 2 Tablet(s) Oral at bedtime  tamsulosin 0.4 milliGRAM(s) Oral every 12 hours    MEDICATIONS  (PRN):  acetaminophen   Tablet .. 650 milliGRAM(s) Oral every 6 hours PRN Mild Pain (1 - 3)        Social History:                -  Home Living Status:  lives with his wife in an elevator accessible apartment building         -  Prior Home Care Services:   none         Baseline Functional Level Prior to Admission:             - ADL's/ IADL's:  independent         - ambulatory status PTA:  walked with a cane    FAMILY HISTORY:  No pertinent family history in first degree relatives        CBC Full  -  ( 2021 06:21 )  WBC Count : 7.14 K/uL  RBC Count : 3.51 M/uL  Hemoglobin : 9.4 g/dL  Hematocrit : 30.5 %  Platelet Count - Automated : 142 K/uL  Mean Cell Volume : 86.9 fl  Mean Cell Hemoglobin : 26.8 pg  Mean Cell Hemoglobin Concentration : 30.8 gm/dL  Auto Neutrophil # : x  Auto Lymphocyte # : x  Auto Monocyte # : x  Auto Eosinophil # : x  Auto Basophil # : x  Auto Neutrophil % : x  Auto Lymphocyte % : x  Auto Monocyte % : x  Auto Eosinophil % : x  Auto Basophil % : x          138  |  100  |  11  ----------------------------<  70  4.0   |  29  |  0.73    Ca    8.8      2021 06:21  Phos  3.3     -  Mg     1.8         TPro  7.0  /  Alb  3.0<L>  /  TBili  0.4  /  DBili  x   /  AST  19  /  ALT  12  /  AlkPhos  83        Urinalysis Basic - ( 2021 03:34 )    Color: Yellow / Appearance: Clear / S.025 / pH: x  Gluc: x / Ketone: NEGATIVE  / Bili: Negative / Urobili: 0.2 E.U./dL   Blood: x / Protein: NEGATIVE mg/dL / Nitrite: POSITIVE   Leuk Esterase: Small / RBC: < 5 /HPF / WBC > 10 /HPF   Sq Epi: x / Non Sq Epi: 0-5 /HPF / Bacteria: Present /HPF          Radiology:      < from: Xray Humerus, Left (21 @ 10:21) >    EXAM:  XR HUMERUS MIN 2 VIEWS LT                          PROCEDURE DATE:  2021          INTERPRETATION:  Clinical history/reason for exam: Postop.    2 views. Gas..    COMPARISON: 2021 time 4:34 AM.    Findings/  impression: Stable positioning of humeral shaft comminuted fracture. Distal humeral/condylar fixation plates and screws.      < from: Xray Wrist 3 Views, Left (21 @ 04:30) >  EXAM:  XR WRIST COMP MIN 3 VIEWS LT                          PROCEDURE DATE:  2021          INTERPRETATION:  Clinical history reason for exam: Trauma.    3 views.    Findings/  impression: No acute fracture or dislocation. Degenerative arthritis Chronic fracture deformities, radial and ulnar styloid processes, first metacarpal base, fifth metacarpal neck.                Vital Signs Last 24 Hrs  T(C): 36.9 (2021 13:05), Max: 37.3 (2021 06:33)  T(F): 98.5 (2021 13:05), Max: 99.2 (2021 06:33)  HR: 82 (2021 13:05) (73 - 82)  BP: 119/66 (2021 13:05) (119/66 - 170/83)  BP(mean): --  RR: 17 (2021 13:05) (16 - 18)  SpO2: 100% (2021 13:05) (96% - 100%)        REVIEW OF SYSTEMS: fall at home, left shoulder pain        Physical Exam: WDWN 72 yo AA gentleman lying in semi Barros's position, with left shoulder sling in place, no acute complaints    Head: normocephalic, atraumatic    Eyes: PERRLA, EOMI, no nystagmus, sclera anicteric    ENT: nasal discharge, uvula midline, no oropharyngeal erythema/exudate    Neck: supple, negative JVD, negative carotid bruits, no thyromegaly    Chest: CTA bilaterally, neg wheeze/ rhonchi/ rales/ crackles/ egophany    Cardiovascular: regular rate and rhythm, neg murmurs/rubs/gallops    Abdomen: soft, non distended, non tender to palpation in all 4 quadrants, negative rebound/guarding, normal bowel sounds    Extremities: WWP, neg cyanosis/clubbing/edema, negative calf tenderness to palpation, negative Leonie's sign    Musculoskeletal: L shoulder sling in place: limited ROM sec to pain and guarding, left wrist dorsal swelling    Skin:      :     Neurologic Exam:    Alert and oriented to person, place, date/year, speech fluent w/o dysarthria, follows commands, recent and remote memory intact, repetition intact, comprehension intact,  attention/concentration intact, fund of knowledge appropriate    Cranial Nerves:     II:                         pupils equal, round and reactive to light, visual fields intact   III/ IV/VI:             extraocular movements intact, neg nystagmus, neg ptosis  V:                        facial sensation intact, V1-3 normal  VII:                      face symmetric, no droop, normal eye closure and smile  VIII:                     hearing intact to finger rub bilaterally  IX and X:             no hoarseness, gag intact, palate/ uvula rise symmetrically  XI:                       SCM/ trapezius strength intact bilateral  XII:                      no tongue deviation    Motor Exam:    Right UE:            : 5/5                             wrist extensors/ flexors: 5/5                             biceps:   5/5                             triceps:  5/5                             deltoid:  5/5                              Left UE:              :  4/5                             wrist extensors/ flexors:  3+/5 sec to jose and swelling                             biceps:   not tested/ patient refused sec to pain                             triceps:  not tested/ patient refused sec to pain                             deltoid:  not tested sec to pain                               Right LE:            5/5    Left LE:              5/5               Sensation:         intact to light touch x 4 extremities                                                  Gait:  not tested        PM&R Impression:    1) s/p fall in the shower with left humeral shaft comminuted fracture/on weight bearing LUE    Plan:    1) Physical therapy focusing on therapeutic exercises, bed mobility/transfer out of bed evaluation, progressive ambulation with assistive devices prn.    2) Anticipated Disposition Plan/Recs:    subacute rehab placement

## 2021-06-09 NOTE — OCCUPATIONAL THERAPY INITIAL EVALUATION ADULT - RANGE OF MOTION EXAMINATION, UPPER EXTREMITY
except R shoulder 2/2 previous/ chronic shoulder dislocation injury; L wrist/ digits grossly assess 2/2 L shoulder/ elbow in bandage/ NWB/Right UE Active ROM was WFL (within functional limits)

## 2021-06-09 NOTE — PROGRESS NOTE ADULT - ASSESSMENT
71 year old Male with PMHx HTN and childhood asthma who presents with a chief complaint of L arm pain after fall in the shower. Found to have a left humeral angulated and diplaced comminuted fracture. Now s/p partial reduction by orthopedic surgery team with plan for conservative management pending HEVER placement.

## 2021-06-09 NOTE — PROGRESS NOTE ADULT - ATTENDING COMMENTS
# Urinary tract infection   -	UCx growing Enterobacter Cloacae (SPACE organism--high incidence of inducible AmpC resistance). Final sensitivities pending.   -	Switch from Ceftriaxone to Bactrim for now. F/u final sensitivities.     #   Heart failure consult tomorrow for possible new diagnosis of HFpEF  - Has Diastolic dysfunction on TTE, severely dilated left atrium,  +pulmonary vascular congestion on CXR, 2+ pitting peripheral edema. May require standing PO diuretic for volume management in HFpEF. Should also establish outpatient follow up with HF clinic. # Left humeral fracture   - f/u ortho and OT regarding availability of Esqueda brace.     # Urinary tract infection   -	UCx growing Enterobacter Cloacae (SPACE organism--high incidence of inducible AmpC resistance). Final sensitivities pending.   -	Switch from Ceftriaxone to Bactrim for now. F/u final sensitivities.     #   Heart failure consult tomorrow for possible new diagnosis of HFpEF  - Has Diastolic dysfunction on TTE, severely dilated left atrium,  +pulmonary vascular congestion on CXR, 2+ pitting peripheral edema. May require standing PO diuretic for volume management in HFpEF. Should also establish outpatient follow up with HF clinic.

## 2021-06-09 NOTE — OCCUPATIONAL THERAPY INITIAL EVALUATION ADULT - MD ORDER
71 year old Male with presents with a chief complaint of L arm pain after fall in the shower. X-ray: L  humerus shaft angulated displaced comminuted fracture. X-Ray L forearm - Elbow soft tissue swelling. No acute fracture.

## 2021-06-09 NOTE — PROGRESS NOTE ADULT - SUBJECTIVE AND OBJECTIVE BOX
GERALD MUHAMMAD, 71y, Male  MRN-[id number]  Patient is a 71y old  Male who presents with a chief complaint of L humerus fracture (2021 16:26)      OVERNIGHT EVENTS: JAMEY.     SUBJECTIVE: Pt seen and examined at bedside this AM. Complaining of some mild arm pain in L arm however overall feeling well. Tired.     12 Point ROS Negative unless noted otherwise above.  -------------------------------------------------------------------------------  VITAL SIGNS:  Vital Signs Last 24 Hrs  T(C): 37.3 (2021 06:33), Max: 37.3 (2021 06:33)  T(F): 99.2 (2021 06:33), Max: 99.2 (2021 06:33)  HR: 73 (2021 09:52) (73 - 78)  BP: 170/73 (2021 09:52) (154/76 - 170/83)  BP(mean): --  RR: 18 (2021 06:33) (16 - 18)  SpO2: 96% (2021 06:33) (96% - 98%)  I&O's Summary      PHYSICAL EXAM:    General: NAD,   HEENT: NC/AT; anicteric sclera; moist mucosal membranes.  Neck: supple, trachea midline  Cardiovascular: RRR, +S1/S2; NO M/R/G  Respiratory: CTA B/L; no W/R/R  Gastrointestinal: soft, NT/ND; +BSx4  Extremities: WWP; no edema or cyanosis. 2+ radial pulses bilateral. LUE in sling and cast. Does not move arm 2/2 to pain but is able to flex and extend fingers. Normal and symmetric sensation to light touch.   Vascular: 2+ radial, DP/PT pulses B/L  Neurological: AAOx3; no focal deficits    ALLERGIES:  Allergies    penicillin (Unknown)    Intolerances        MEDICATIONS:  MEDICATIONS  (STANDING):  cefTRIAXone   IVPB 1000 milliGRAM(s) IV Intermittent every 24 hours  enoxaparin Injectable 40 milliGRAM(s) SubCutaneous every 24 hours  hydrochlorothiazide 25 milliGRAM(s) Oral every 24 hours  methadone    Tablet 180 milliGRAM(s) Oral every 24 hours  NIFEdipine XL 60 milliGRAM(s) Oral daily  senna 2 Tablet(s) Oral at bedtime  tamsulosin 0.4 milliGRAM(s) Oral every 12 hours    MEDICATIONS  (PRN):  acetaminophen   Tablet .. 650 milliGRAM(s) Oral every 6 hours PRN Mild Pain (1 - 3)      -------------------------------------------------------------------------------  LABS:                        9.4    7.14  )-----------( 142      ( 2021 06:21 )             30.5     06-    138  |  100  |  11  ----------------------------<  70  4.0   |  29  |  0.73    Ca    8.8      2021 06:21  Phos  3.3     06-  Mg     1.8     -    TPro  7.0  /  Alb  3.0<L>  /  TBili  0.4  /  DBili  x   /  AST  19  /  ALT  12  /  AlkPhos  83  06-09    LIVER FUNCTIONS - ( 2021 06:21 )  Alb: 3.0 g/dL / Pro: 7.0 g/dL / ALK PHOS: 83 U/L / ALT: 12 U/L / AST: 19 U/L / GGT: x           PT/INR - ( 2021 01:14 )   PT: 12.4 sec;   INR: 1.04          PTT - ( 2021 01:14 )  PTT:30.2 sec  Urinalysis Basic - ( 2021 03:34 )    Color: Yellow / Appearance: Clear / S.025 / pH: x  Gluc: x / Ketone: NEGATIVE  / Bili: Negative / Urobili: 0.2 E.U./dL   Blood: x / Protein: NEGATIVE mg/dL / Nitrite: POSITIVE   Leuk Esterase: Small / RBC: < 5 /HPF / WBC > 10 /HPF   Sq Epi: x / Non Sq Epi: 0-5 /HPF / Bacteria: Present /HPF      CAPILLARY BLOOD GLUCOSE          COVID-19 PCR: NotDetec (2021 01:14)      RADIOLOGY & ADDITIONAL TESTS: Reviewed.

## 2021-06-10 LAB
-  AMPICILLIN/SULBACTAM: SIGNIFICANT CHANGE UP
-  AMPICILLIN: SIGNIFICANT CHANGE UP
-  CEFAZOLIN: SIGNIFICANT CHANGE UP
-  CEFEPIME: SIGNIFICANT CHANGE UP
-  CEFTRIAXONE: SIGNIFICANT CHANGE UP
-  CIPROFLOXACIN: SIGNIFICANT CHANGE UP
-  ERTAPENEM: SIGNIFICANT CHANGE UP
-  GENTAMICIN: SIGNIFICANT CHANGE UP
-  NITROFURANTOIN: SIGNIFICANT CHANGE UP
-  PIPERACILLIN/TAZOBACTAM: SIGNIFICANT CHANGE UP
-  TOBRAMYCIN: SIGNIFICANT CHANGE UP
-  TRIMETHOPRIM/SULFAMETHOXAZOLE: SIGNIFICANT CHANGE UP
ANION GAP SERPL CALC-SCNC: 10 MMOL/L — SIGNIFICANT CHANGE UP (ref 5–17)
BLD GP AB SCN SERPL QL: NEGATIVE — SIGNIFICANT CHANGE UP
BUN SERPL-MCNC: 13 MG/DL — SIGNIFICANT CHANGE UP (ref 7–23)
CALCIUM SERPL-MCNC: 8.8 MG/DL — SIGNIFICANT CHANGE UP (ref 8.4–10.5)
CHLORIDE SERPL-SCNC: 94 MMOL/L — LOW (ref 96–108)
CO2 SERPL-SCNC: 29 MMOL/L — SIGNIFICANT CHANGE UP (ref 22–31)
CREAT SERPL-MCNC: 0.74 MG/DL — SIGNIFICANT CHANGE UP (ref 0.5–1.3)
CULTURE RESULTS: SIGNIFICANT CHANGE UP
GLUCOSE SERPL-MCNC: 77 MG/DL — SIGNIFICANT CHANGE UP (ref 70–99)
HCT VFR BLD CALC: 31.4 % — LOW (ref 39–50)
HGB BLD-MCNC: 9.9 G/DL — LOW (ref 13–17)
MAGNESIUM SERPL-MCNC: 2 MG/DL — SIGNIFICANT CHANGE UP (ref 1.6–2.6)
MCHC RBC-ENTMCNC: 27 PG — SIGNIFICANT CHANGE UP (ref 27–34)
MCHC RBC-ENTMCNC: 31.5 GM/DL — LOW (ref 32–36)
MCV RBC AUTO: 85.8 FL — SIGNIFICANT CHANGE UP (ref 80–100)
METHOD TYPE: SIGNIFICANT CHANGE UP
NRBC # BLD: 0 /100 WBCS — SIGNIFICANT CHANGE UP (ref 0–0)
ORGANISM # SPEC MICROSCOPIC CNT: SIGNIFICANT CHANGE UP
ORGANISM # SPEC MICROSCOPIC CNT: SIGNIFICANT CHANGE UP
PHOSPHATE SERPL-MCNC: 3.3 MG/DL — SIGNIFICANT CHANGE UP (ref 2.5–4.5)
PLATELET # BLD AUTO: 147 K/UL — LOW (ref 150–400)
POTASSIUM SERPL-MCNC: 4.1 MMOL/L — SIGNIFICANT CHANGE UP (ref 3.5–5.3)
POTASSIUM SERPL-SCNC: 4.1 MMOL/L — SIGNIFICANT CHANGE UP (ref 3.5–5.3)
RBC # BLD: 3.66 M/UL — LOW (ref 4.2–5.8)
RBC # FLD: 13.9 % — SIGNIFICANT CHANGE UP (ref 10.3–14.5)
RH IG SCN BLD-IMP: POSITIVE — SIGNIFICANT CHANGE UP
SODIUM SERPL-SCNC: 133 MMOL/L — LOW (ref 135–145)
SPECIMEN SOURCE: SIGNIFICANT CHANGE UP
WBC # BLD: 7.95 K/UL — SIGNIFICANT CHANGE UP (ref 3.8–10.5)
WBC # FLD AUTO: 7.95 K/UL — SIGNIFICANT CHANGE UP (ref 3.8–10.5)

## 2021-06-10 PROCEDURE — 99232 SBSQ HOSP IP/OBS MODERATE 35: CPT | Mod: GC

## 2021-06-10 PROCEDURE — 99233 SBSQ HOSP IP/OBS HIGH 50: CPT | Mod: GC

## 2021-06-10 PROCEDURE — 73060 X-RAY EXAM OF HUMERUS: CPT | Mod: 26,LT

## 2021-06-10 RX ORDER — FUROSEMIDE 40 MG
20 TABLET ORAL ONCE
Refills: 0 | Status: COMPLETED | OUTPATIENT
Start: 2021-06-10 | End: 2021-06-10

## 2021-06-10 RX ORDER — FUROSEMIDE 40 MG
20 TABLET ORAL ONCE
Refills: 0 | Status: DISCONTINUED | OUTPATIENT
Start: 2021-06-10 | End: 2021-06-10

## 2021-06-10 RX ADMIN — ENOXAPARIN SODIUM 40 MILLIGRAM(S): 100 INJECTION SUBCUTANEOUS at 09:35

## 2021-06-10 RX ADMIN — Medication 25 MILLIGRAM(S): at 09:35

## 2021-06-10 RX ADMIN — Medication 650 MILLIGRAM(S): at 22:00

## 2021-06-10 RX ADMIN — SENNA PLUS 2 TABLET(S): 8.6 TABLET ORAL at 21:09

## 2021-06-10 RX ADMIN — METHADONE HYDROCHLORIDE 180 MILLIGRAM(S): 40 TABLET ORAL at 09:35

## 2021-06-10 RX ADMIN — Medication 1 TABLET(S): at 17:24

## 2021-06-10 RX ADMIN — Medication 60 MILLIGRAM(S): at 06:10

## 2021-06-10 RX ADMIN — TAMSULOSIN HYDROCHLORIDE 0.4 MILLIGRAM(S): 0.4 CAPSULE ORAL at 17:24

## 2021-06-10 RX ADMIN — Medication 1 TABLET(S): at 06:10

## 2021-06-10 RX ADMIN — Medication 20 MILLIGRAM(S): at 16:46

## 2021-06-10 RX ADMIN — TAMSULOSIN HYDROCHLORIDE 0.4 MILLIGRAM(S): 0.4 CAPSULE ORAL at 06:10

## 2021-06-10 RX ADMIN — Medication 650 MILLIGRAM(S): at 21:09

## 2021-06-10 NOTE — DIETITIAN INITIAL EVALUATION ADULT. - ADD RECOMMEND
1. Continue DASH diet 2. Add Ensure Enlive QD (350kcal/20gpro) 2/2 pt preference 3. Trend wts 4. Monitor lytes and replete 5. RD to remain available prn

## 2021-06-10 NOTE — DIETITIAN INITIAL EVALUATION ADULT. - PROBLEM SELECTOR PLAN 1
Angulated and displaced comminuted fracture after fall on outstretched hand in the shower on June 7th. Imaging noted for said finding along with greater humeral tuberosity peritendonitis, bankart lesion, distal humeral/condylar fixation plates and screws. Left elbow also with soft tissue swelling but no acute fracture.   - ortho team attempted partial reduction. Per their impression, plan for conservative management with coaptation splint and later transition to oneil/functional brace as an outpatient.   - no acute operative intervention planned  - Per ortho, post splint x-rays reviewed and fracture with acceptable alignment  - currently denies any numbness, tingling, and weakness of LUE  - PRN tylenol (mild), conservative hold off opioids given he is on methadone  - ice packs   - PT eval   - f/u outpatient with Dr. Donaldson

## 2021-06-10 NOTE — PROGRESS NOTE ADULT - SUBJECTIVE AND OBJECTIVE BOX
GERALD MUHAMMAD, 71y, Male  MRN-[id number]  Patient is a 71y old  Male who presents with a chief complaint of L humerus fracture (09 Jun 2021 14:12)      OVERNIGHT EVENTS:     SUBJECTIVE:    12 Point ROS Negative unless noted otherwise above.  -------------------------------------------------------------------------------  VITAL SIGNS:  Vital Signs Last 24 Hrs  T(C): 36.7 (10 Louis 2021 09:31), Max: 36.9 (09 Jun 2021 13:05)  T(F): 98 (10 Louis 2021 09:31), Max: 98.5 (09 Jun 2021 13:05)  HR: 78 (10 Louis 2021 09:31) (74 - 82)  BP: 121/67 (10 Louis 2021 09:31) (119/66 - 152/79)  BP(mean): --  RR: 18 (10 Louis 2021 09:31) (17 - 19)  SpO2: 97% (10 Louis 2021 09:31) (97% - 100%)  I&O's Summary    09 Jun 2021 07:01  -  10 Louis 2021 07:00  --------------------------------------------------------  IN: 0 mL / OUT: 820 mL / NET: -820 mL        PHYSICAL EXAM:    General: NAD, well developed  HEENT: NC/AT; EOMI, PERRLA, anicteric sclera; moist mucosal membranes.  Neck: supple, trachea midline  Cardiovascular: RRR, +S1/S2; NO M/R/G  Respiratory: CTA B/L; no W/R/R  Gastrointestinal: soft, NT/ND; +BSx4  Extremities: WWP; no edema or cyanosis  Vascular: 2+ radial, DP/PT pulses B/L  Neurological: AAOx3; no focal deficits    ALLERGIES:  Allergies    penicillin (Unknown)    Intolerances        MEDICATIONS:  MEDICATIONS  (STANDING):  enoxaparin Injectable 40 milliGRAM(s) SubCutaneous every 24 hours  hydrochlorothiazide 25 milliGRAM(s) Oral every 24 hours  methadone    Tablet 180 milliGRAM(s) Oral every 24 hours  NIFEdipine XL 60 milliGRAM(s) Oral daily  senna 2 Tablet(s) Oral at bedtime  tamsulosin 0.4 milliGRAM(s) Oral every 12 hours  trimethoprim  160 mG/sulfamethoxazole 800 mG 1 Tablet(s) Oral two times a day    MEDICATIONS  (PRN):  acetaminophen   Tablet .. 650 milliGRAM(s) Oral every 6 hours PRN Mild Pain (1 - 3)      -------------------------------------------------------------------------------  LABS:                        9.9    7.95  )-----------( 147      ( 10 Louis 2021 08:28 )             31.4     06-10    133<L>  |  94<L>  |  13  ----------------------------<  77  4.1   |  29  |  0.74    Ca    8.8      10 Louis 2021 08:28  Phos  3.3     06-10  Mg     2.0     06-10    TPro  7.0  /  Alb  3.0<L>  /  TBili  0.4  /  DBili  x   /  AST  19  /  ALT  12  /  AlkPhos  83  06-09    LIVER FUNCTIONS - ( 09 Jun 2021 06:21 )  Alb: 3.0 g/dL / Pro: 7.0 g/dL / ALK PHOS: 83 U/L / ALT: 12 U/L / AST: 19 U/L / GGT: x               CAPILLARY BLOOD GLUCOSE          Culture - Urine (collected 08 Jun 2021 07:38)  Source: .Urine Clean Catch (Midstream)  Final Report (10 Louis 2021 10:32):    >100,000 CFU/ml Enterobacter cloacae complex  Organism: Enterobacter cloacae complex (10 Louis 2021 10:32)  Organism: Enterobacter cloacae complex (10 Louis 2021 10:32)      COVID-19 PCR: NotDetec (08 Jun 2021 01:14)      RADIOLOGY & ADDITIONAL TESTS: Reviewed.       GERALD MUHAMMAD, 71y, Male  [id number]  Patient is a 71y old  Male who presents with a chief complaint of L humerus fracture (09 Jun 2021 14:12)      OVERNIGHT EVENTS: JAMEY.    SUBJECTIVE: Pt seen and examined at bedside this AM. Feeling well. Arm pain improved. Esqueda brace placed yesterday. Still complaining of some urinary frequency but no dysuria.     12 Point ROS Negative unless noted otherwise above.  -------------------------------------------------------------------------------  VITAL SIGNS:  Vital Signs Last 24 Hrs  T(C): 36.7 (10 Louis 2021 09:31), Max: 36.9 (09 Jun 2021 13:05)  T(F): 98 (10 Louis 2021 09:31), Max: 98.5 (09 Jun 2021 13:05)  HR: 78 (10 Louis 2021 09:31) (74 - 82)  BP: 121/67 (10 Louis 2021 09:31) (119/66 - 152/79)  BP(mean): --  RR: 18 (10 Louis 2021 09:31) (17 - 19)  SpO2: 97% (10 Louis 2021 09:31) (97% - 100%)  I&O's Summary    09 Jun 2021 07:01  -  10 Louis 2021 07:00  --------------------------------------------------------  IN: 0 mL / OUT: 820 mL / NET: -820 mL        PHYSICAL EXAM:    General: NAD, well developed  HEENT: NC/AT;anicteric sclera; moist mucosal membranes.  Neck: supple, trachea midline  Cardiovascular: RRR, +S1/S2; NO M/R/G  Respiratory: CTA B/L; no W/R/R  Gastrointestinal: soft, NT/ND; +BSx4  Extremities: WWP; 2+ edema to mid shin. Peeling skin and chronic venous stasis changes.   Vascular: 2+ radial, pulses B/L  Neurological: AAOx3; no focal deficits    ALLERGIES:  Allergies    penicillin (Unknown)    Intolerances        MEDICATIONS:  MEDICATIONS  (STANDING):  enoxaparin Injectable 40 milliGRAM(s) SubCutaneous every 24 hours  hydrochlorothiazide 25 milliGRAM(s) Oral every 24 hours  methadone    Tablet 180 milliGRAM(s) Oral every 24 hours  NIFEdipine XL 60 milliGRAM(s) Oral daily  senna 2 Tablet(s) Oral at bedtime  tamsulosin 0.4 milliGRAM(s) Oral every 12 hours  trimethoprim  160 mG/sulfamethoxazole 800 mG 1 Tablet(s) Oral two times a day    MEDICATIONS  (PRN):  acetaminophen   Tablet .. 650 milliGRAM(s) Oral every 6 hours PRN Mild Pain (1 - 3)      -------------------------------------------------------------------------------  LABS:                        9.9    7.95  )-----------( 147      ( 10 Louis 2021 08:28 )             31.4     06-10    133<L>  |  94<L>  |  13  ----------------------------<  77  4.1   |  29  |  0.74    Ca    8.8      10 Louis 2021 08:28  Phos  3.3     06-10  Mg     2.0     06-10    TPro  7.0  /  Alb  3.0<L>  /  TBili  0.4  /  DBili  x   /  AST  19  /  ALT  12  /  AlkPhos  83  06-09    LIVER FUNCTIONS - ( 09 Jun 2021 06:21 )  Alb: 3.0 g/dL / Pro: 7.0 g/dL / ALK PHOS: 83 U/L / ALT: 12 U/L / AST: 19 U/L / GGT: x               CAPILLARY BLOOD GLUCOSE          Culture - Urine (collected 08 Jun 2021 07:38)  Source: .Urine Clean Catch (Midstream)  Final Report (10 Louis 2021 10:32):    >100,000 CFU/ml Enterobacter cloacae complex  Organism: Enterobacter cloacae complex (10 Louis 2021 10:32)  Organism: Enterobacter cloacae complex (10 Louis 2021 10:32)      COVID-19 PCR: NotDetec (08 Jun 2021 01:14)      RADIOLOGY & ADDITIONAL TESTS: Reviewed.

## 2021-06-10 NOTE — DIETITIAN INITIAL EVALUATION ADULT. - PROBLEM SELECTOR PLAN 2
Urinalysis positive for nitrites, small LE, >10 WBC, + bacteria. Patient denies any dysuria or urethral discharge, but admits to polyuria within the past week. Denies any fever, chills, flank pain, confusion.   - s/p bactrim DS PO x1   - c/w CTX 1g daily x5 days (6/8 - )

## 2021-06-10 NOTE — CONSULT NOTE ADULT - ATTENDING COMMENTS
Pt. seen/ examined  No additional injuries  LUE in coapt. splint  NVID  XR reviewed - good alignment, bone apposition  Agree with plan: nonop treatment, will switch to Esqueda brace when appropriate  F/up ortho clinic
Mild lower extremity edema, lungs CTA. pr-.   - recommend IV lasix 20 x 1, repeat CXR in AM       discussed at length with fellow  TOPHER Coats

## 2021-06-10 NOTE — DIETITIAN INITIAL EVALUATION ADULT. - PROBLEM SELECTOR PLAN 3
BP on admission 154/81. Possibly elevated from either underlying diagnosis and/or pain   - Per med rec, takes procardia XL 60 mg daily  - Given peripheral edema will dose for HCTZ 25 mg PO daily for now    #BPH  - c/w home tamsulosin 0.4mg q12h

## 2021-06-10 NOTE — CONSULT NOTE ADULT - ASSESSMENT
71 year old Male with PMHx HTN and childhood asthma who presents with a chief complaint of L arm pain after fall in the shower. Found to have a left humeral angulated and diplaced comminuted fracture. Now s/p partial reduction by orthopedic surgery team with plan for conservative management pending HEVER placement course c/b enterbacter cloacae UTI. Cardiology consulted with concern for volume overload.    #Volume status   TTE with the followin. Normal left and right ventricular size and systolic function.   3. Severely dilated left atrium.   4. Mildly dilated right atrium.   5. No significant valvular disease.   6. Pulmonary artery systolic pressure is 31 mmHg.   7. No pericardial effusion.    Mild lower extremity edema, lungs CTA. pr-.   - recommend IV lasix 20 x 1, repeat CXR in AM     Recs not final until attested by cardiology attending.  71 year old Male with PMHx HTN and childhood asthma who presents with a chief complaint of L arm pain after fall in the shower. Found to have a left humeral angulated and diplaced comminuted fracture. Now s/p partial reduction by orthopedic surgery team with plan for conservative management pending HEVER placement course c/b enterbacter cloacae UTI. Cardiology consulted with concern for volume overload.    #Volume status   TTE with the followin. Normal left and right ventricular size and systolic function.   3. Severely dilated left atrium.   4. Mildly dilated right atrium.   5. No significant valvular disease.   6. Pulmonary artery systolic pressure is 31 mmHg.   7. No pericardial effusion.    Mild lower extremity edema, lungs CTA. pr-.   - recommend IV lasix 20 x 1, repeat CXR in AM

## 2021-06-10 NOTE — CONSULT NOTE ADULT - SUBJECTIVE AND OBJECTIVE BOX
HPI:  71 year old Male with PMHx HTN and childhood asthma who presents with a chief complaint of L arm pain after fall in the shower. Found to have a left humeral angulated and diplaced comminuted fracture. Now s/p partial reduction by orthopedic surgery team with plan for conservative management pending HEVER placement course c/b enterbacter cloacae UTI. Cardiology consulted with concern for volume overload. Patient denies SOB, chest pain, abdominal pain. Able to lie flat without any problems.     PAST MEDICAL & SURGICAL HISTORY:  Asthma    HTN (hypertension)        PREVIOUS DIAGNOSTIC TESTING:    [ ] Echocardiogram:  [ ] Stress Test:  [ ] Catheterization: 	    FAMILY HISTORY:  No pertinent family history in first degree relatives        ALLERGIES/INTOLERANCES:  penicillin (Unknown)    HOME MEDICATIONS:    INPATIENT MEDICATIONS:  hydrochlorothiazide 25 milliGRAM(s) Oral every 24 hours  NIFEdipine XL 60 milliGRAM(s) Oral daily  tamsulosin 0.4 milliGRAM(s) Oral every 12 hours    enoxaparin Injectable 40 milliGRAM(s) SubCutaneous every 24 hours    acetaminophen   Tablet .. 650 milliGRAM(s) Oral every 6 hours PRN  methadone    Tablet 180 milliGRAM(s) Oral every 24 hours  senna 2 Tablet(s) Oral at bedtime  trimethoprim  160 mG/sulfamethoxazole 800 mG 1 Tablet(s) Oral two times a day      REVIEW OF SYSTEMS: See HPI     PHYSICAL EXAM:  Gen: NAD   HEENT: EOMI   Cor: Normal s1, s2. RRR.   Lungs: CTA b/l   Abd: soft, non-tender, non-distended  Ext: mild edema b/l of lower extremity  Neuro: alert and attentive    T(C): 37.1 (06-10-21 @ 13:08), Max: 37.1 (06-10-21 @ 13:08)  HR: 76 (06-10-21 @ 13:08) (74 - 78)  BP: 121/65 (06-10-21 @ 13:08) (121/65 - 152/79)  RR: 17 (06-10-21 @ 13:08) (17 - 19)  SpO2: 95% (06-10-21 @ 13:08) (95% - 98%)  Wt(kg): --    I&O's Summary    09 Jun 2021 07:01  -  10 Louis 2021 07:00  --------------------------------------------------------  IN: 0 mL / OUT: 820 mL / NET: -820 mL        	  LABS:                        9.9    7.95  )-----------( 147      ( 10 Louis 2021 08:28 )             31.4     06-10    133<L>  |  94<L>  |  13  ----------------------------<  77  4.1   |  29  |  0.74    Ca    8.8      10 Louis 2021 08:28  Phos  3.3     06-10  Mg     2.0     06-10    TPro  7.0  /  Alb  3.0<L>  /  TBili  0.4  /  DBili  x   /  AST  19  /  ALT  12  /  AlkPhos  83  06-09      Lipid Profile:   HgA1c:   TSH:     CARDIAC MARKERS:          proBNP: Serum Pro-Brain Natriuretic Peptide: 255 pg/mL (06-08 @ 01:14)      RADIOLOGY:         HPI:  71 year old Male with PMHx HTN and childhood asthma who presents with a chief complaint of L arm pain after fall in the shower. Found to have a left humeral angulated and diplaced comminuted fracture. Now s/p partial reduction by orthopedic surgery team with plan for conservative management pending HEVER placement course c/b enterbacter cloacae UTI. Cardiology consulted with concern for volume overload. Patient denies SOB, chest pain, abdominal pain. Able to lie flat without any problems.     PAST MEDICAL & SURGICAL HISTORY:  Asthma    HTN (hypertension)    PREVIOUS DIAGNOSTIC TESTING:        FAMILY HISTORY:  No pertinent family history in first degree relatives        ALLERGIES/INTOLERANCES:  penicillin (Unknown)    HOME MEDICATIONS:    INPATIENT MEDICATIONS:  hydrochlorothiazide 25 milliGRAM(s) Oral every 24 hours  NIFEdipine XL 60 milliGRAM(s) Oral daily  tamsulosin 0.4 milliGRAM(s) Oral every 12 hours    enoxaparin Injectable 40 milliGRAM(s) SubCutaneous every 24 hours    acetaminophen   Tablet .. 650 milliGRAM(s) Oral every 6 hours PRN  methadone    Tablet 180 milliGRAM(s) Oral every 24 hours  senna 2 Tablet(s) Oral at bedtime  trimethoprim  160 mG/sulfamethoxazole 800 mG 1 Tablet(s) Oral two times a day      REVIEW OF SYSTEMS: See HPI     PHYSICAL EXAM:  Gen: NAD   HEENT: EOMI   Cor: Normal s1, s2. RRR.   Lungs: CTA b/l   Abd: soft, non-tender, non-distended  Ext: mild edema b/l of lower extremity  Neuro: alert and attentive    T(C): 37.1 (06-10-21 @ 13:08), Max: 37.1 (06-10-21 @ 13:08)  HR: 76 (06-10-21 @ 13:08) (74 - 78)  BP: 121/65 (06-10-21 @ 13:08) (121/65 - 152/79)  RR: 17 (06-10-21 @ 13:08) (17 - 19)  SpO2: 95% (06-10-21 @ 13:08) (95% - 98%)  Wt(kg): --    I&O's Summary    09 Jun 2021 07:01  -  10 Louis 2021 07:00  --------------------------------------------------------  IN: 0 mL / OUT: 820 mL / NET: -820 mL        	  LABS:                        9.9    7.95  )-----------( 147      ( 10 Louis 2021 08:28 )             31.4     06-10    133<L>  |  94<L>  |  13  ----------------------------<  77  4.1   |  29  |  0.74    Ca    8.8      10 Louis 2021 08:28  Phos  3.3     06-10  Mg     2.0     06-10    TPro  7.0  /  Alb  3.0<L>  /  TBili  0.4  /  DBili  x   /  AST  19  /  ALT  12  /  AlkPhos  83  06-09        proBNP: Serum Pro-Brain Natriuretic Peptide: 255 pg/mL (06-08 @ 01:14)

## 2021-06-10 NOTE — DIETITIAN INITIAL EVALUATION ADULT. - PROBLEM SELECTOR PLAN 6
History of childhood asthma for which he used to take ventolin inhaler as needed and nasal drops. Has not used rescue inhaler within the past year.   - continue to monitor

## 2021-06-10 NOTE — PROGRESS NOTE ADULT - ASSESSMENT
71 year old Male with PMHx HTN and childhood asthma who presents with a chief complaint of L arm pain after fall in the shower. Found to have a left humeral angulated and diplaced comminuted fracture. Now s/p partial reduction by orthopedic surgery team with plan for conservative management pending HEVER placement course c/b enterbacter cloacae UTI.

## 2021-06-10 NOTE — DIETITIAN INITIAL EVALUATION ADULT. - PROBLEM SELECTOR PLAN 4
Says he is on methadone 180mg for which he receives his dose at the [facility] on [address].  - Confirmed dose with clinic (tel: [phone])

## 2021-06-10 NOTE — DIETITIAN INITIAL EVALUATION ADULT. - OTHER INFO
71 year old Male with PMHx HTN and childhood asthma who presents with a chief complaint of L arm pain after fall in the shower. Found to have a left humeral angulated and diplaced comminuted fracture. Now s/p partial reduction by orthopedic surgery team with plan for conservative management pending HEEVR placement.    Pt seen in room, resting in bed. Pt reports good appetite PTA and currently. Denies chewing/swallowing difficulty. On DASH diet, reports eating >75% of breakfast this AM. Pt requesting Ensure, likes chocolate flavor. Pending order placed. Pt reports stable wt at 170lbs, consistent with ABW. Denies N/V/D/C at present. 2+edema to right and left foot. No pain noted. Please see recs below. Will continue to follow per RD protocol.

## 2021-06-10 NOTE — DIETITIAN INITIAL EVALUATION ADULT. - PROBLEM SELECTOR PLAN 7
Hgb 11.0/Hct 36.5 on admission with unclear baseline. Denies any s/s bleeding including hemoptysis, hematuria, dark tarry stools, melena.   - active T&S  - transfuse for Hgb < 7

## 2021-06-10 NOTE — PROGRESS NOTE ADULT - ATTENDING COMMENTS
placed in [facility] by ortho  UCx growing enterobacter cloacae sensitive to trimethoprim/sulfamethoxazole. - continue trimethoprim/sulfamethoxazole  f/u HF recs

## 2021-06-11 PROBLEM — Z00.00 ENCOUNTER FOR PREVENTIVE HEALTH EXAMINATION: Status: ACTIVE | Noted: 2021-06-11

## 2021-06-11 PROBLEM — I10 ESSENTIAL (PRIMARY) HYPERTENSION: Chronic | Status: ACTIVE | Noted: 2021-06-08

## 2021-06-11 PROBLEM — J45.909 UNSPECIFIED ASTHMA, UNCOMPLICATED: Chronic | Status: ACTIVE | Noted: 2021-06-08

## 2021-06-11 LAB
ANION GAP SERPL CALC-SCNC: 10 MMOL/L — SIGNIFICANT CHANGE UP (ref 5–17)
BUN SERPL-MCNC: 16 MG/DL — SIGNIFICANT CHANGE UP (ref 7–23)
CALCIUM SERPL-MCNC: 8.9 MG/DL — SIGNIFICANT CHANGE UP (ref 8.4–10.5)
CHLORIDE SERPL-SCNC: 91 MMOL/L — LOW (ref 96–108)
CO2 SERPL-SCNC: 28 MMOL/L — SIGNIFICANT CHANGE UP (ref 22–31)
CREAT ?TM UR-MCNC: 68 MG/DL — SIGNIFICANT CHANGE UP
CREAT SERPL-MCNC: 0.86 MG/DL — SIGNIFICANT CHANGE UP (ref 0.5–1.3)
GLUCOSE SERPL-MCNC: 87 MG/DL — SIGNIFICANT CHANGE UP (ref 70–99)
HCT VFR BLD CALC: 32.6 % — LOW (ref 39–50)
HCV RNA FLD QL NAA+PROBE: SIGNIFICANT CHANGE UP
HCV RNA SERPL NAA DL=5-ACNC: SIGNIFICANT CHANGE UP IU/ML
HCV RNA SPEC NAA+PROBE-LOG IU: SIGNIFICANT CHANGE UP LOG10IU/ML
HGB BLD-MCNC: 10.1 G/DL — LOW (ref 13–17)
MAGNESIUM SERPL-MCNC: 2 MG/DL — SIGNIFICANT CHANGE UP (ref 1.6–2.6)
MCHC RBC-ENTMCNC: 26.8 PG — LOW (ref 27–34)
MCHC RBC-ENTMCNC: 31 GM/DL — LOW (ref 32–36)
MCV RBC AUTO: 86.5 FL — SIGNIFICANT CHANGE UP (ref 80–100)
NRBC # BLD: 0 /100 WBCS — SIGNIFICANT CHANGE UP (ref 0–0)
OSMOLALITY UR: 332 MOSM/KG — SIGNIFICANT CHANGE UP (ref 300–900)
PHOSPHATE SERPL-MCNC: 3.6 MG/DL — SIGNIFICANT CHANGE UP (ref 2.5–4.5)
PLATELET # BLD AUTO: 142 K/UL — LOW (ref 150–400)
POTASSIUM SERPL-MCNC: 4.1 MMOL/L — SIGNIFICANT CHANGE UP (ref 3.5–5.3)
POTASSIUM SERPL-SCNC: 4.1 MMOL/L — SIGNIFICANT CHANGE UP (ref 3.5–5.3)
RBC # BLD: 3.77 M/UL — LOW (ref 4.2–5.8)
RBC # FLD: 13.9 % — SIGNIFICANT CHANGE UP (ref 10.3–14.5)
SODIUM SERPL-SCNC: 129 MMOL/L — LOW (ref 135–145)
SODIUM UR-SCNC: 70 MMOL/L — SIGNIFICANT CHANGE UP
WBC # BLD: 7.54 K/UL — SIGNIFICANT CHANGE UP (ref 3.8–10.5)
WBC # FLD AUTO: 7.54 K/UL — SIGNIFICANT CHANGE UP (ref 3.8–10.5)

## 2021-06-11 PROCEDURE — 99231 SBSQ HOSP IP/OBS SF/LOW 25: CPT | Mod: GC

## 2021-06-11 PROCEDURE — 71045 X-RAY EXAM CHEST 1 VIEW: CPT | Mod: 26

## 2021-06-11 PROCEDURE — 99233 SBSQ HOSP IP/OBS HIGH 50: CPT | Mod: GC

## 2021-06-11 RX ORDER — FUROSEMIDE 40 MG
20 TABLET ORAL DAILY
Refills: 0 | Status: DISCONTINUED | OUTPATIENT
Start: 2021-06-12 | End: 2021-06-13

## 2021-06-11 RX ADMIN — TAMSULOSIN HYDROCHLORIDE 0.4 MILLIGRAM(S): 0.4 CAPSULE ORAL at 18:27

## 2021-06-11 RX ADMIN — Medication 25 MILLIGRAM(S): at 09:03

## 2021-06-11 RX ADMIN — ENOXAPARIN SODIUM 40 MILLIGRAM(S): 100 INJECTION SUBCUTANEOUS at 08:07

## 2021-06-11 RX ADMIN — Medication 650 MILLIGRAM(S): at 22:30

## 2021-06-11 RX ADMIN — Medication 650 MILLIGRAM(S): at 05:37

## 2021-06-11 RX ADMIN — METHADONE HYDROCHLORIDE 180 MILLIGRAM(S): 40 TABLET ORAL at 08:07

## 2021-06-11 RX ADMIN — Medication 60 MILLIGRAM(S): at 05:38

## 2021-06-11 RX ADMIN — SENNA PLUS 2 TABLET(S): 8.6 TABLET ORAL at 21:24

## 2021-06-11 RX ADMIN — Medication 650 MILLIGRAM(S): at 21:24

## 2021-06-11 RX ADMIN — Medication 650 MILLIGRAM(S): at 06:30

## 2021-06-11 RX ADMIN — Medication 1 TABLET(S): at 18:27

## 2021-06-11 RX ADMIN — TAMSULOSIN HYDROCHLORIDE 0.4 MILLIGRAM(S): 0.4 CAPSULE ORAL at 05:37

## 2021-06-11 RX ADMIN — Medication 1 TABLET(S): at 05:37

## 2021-06-11 NOTE — PROGRESS NOTE ADULT - SUBJECTIVE AND OBJECTIVE BOX
GERALD MUHAMMAD, 71y, Male  MRN-[id number]  Patient is a 71y old  Male who presents with a chief complaint of L humerus fracture (11 Jun 2021 08:13)      OVERNIGHT EVENTS:     SUBJECTIVE:    12 Point ROS Negative unless noted otherwise above.  -------------------------------------------------------------------------------  VITAL SIGNS:  Vital Signs Last 24 Hrs  T(C): 36.7 (11 Jun 2021 08:52), Max: 37.1 (10 Louis 2021 13:08)  T(F): 98 (11 Jun 2021 08:52), Max: 98.8 (10 Louis 2021 13:08)  HR: 83 (11 Jun 2021 08:52) (75 - 83)  BP: 130/68 (11 Jun 2021 08:52) (121/65 - 154/98)  BP(mean): --  RR: 17 (11 Jun 2021 08:52) (17 - 19)  SpO2: 98% (11 Jun 2021 08:52) (95% - 98%)  I&O's Summary    10 Louis 2021 07:01  -  11 Jun 2021 07:00  --------------------------------------------------------  IN: 0 mL / OUT: 850 mL / NET: -850 mL        PHYSICAL EXAM:    General: NAD, well developed  HEENT: NC/AT; EOMI, PERRLA, anicteric sclera; moist mucosal membranes.  Neck: supple, trachea midline  Cardiovascular: RRR, +S1/S2; NO M/R/G  Respiratory: CTA B/L; no W/R/R  Gastrointestinal: soft, NT/ND; +BSx4  Extremities: WWP; no edema or cyanosis  Vascular: 2+ radial, DP/PT pulses B/L  Neurological: AAOx3; no focal deficits    ALLERGIES:  Allergies    penicillin (Unknown)    Intolerances        MEDICATIONS:  MEDICATIONS  (STANDING):  enoxaparin Injectable 40 milliGRAM(s) SubCutaneous every 24 hours  hydrochlorothiazide 25 milliGRAM(s) Oral every 24 hours  methadone    Tablet 180 milliGRAM(s) Oral every 24 hours  NIFEdipine XL 60 milliGRAM(s) Oral daily  senna 2 Tablet(s) Oral at bedtime  tamsulosin 0.4 milliGRAM(s) Oral every 12 hours  trimethoprim  160 mG/sulfamethoxazole 800 mG 1 Tablet(s) Oral two times a day    MEDICATIONS  (PRN):  acetaminophen   Tablet .. 650 milliGRAM(s) Oral every 6 hours PRN Mild Pain (1 - 3)      -------------------------------------------------------------------------------  LABS:                        10.1   7.54  )-----------( 142      ( 11 Jun 2021 06:44 )             32.6     06-11    129<L>  |  91<L>  |  16  ----------------------------<  87  4.1   |  28  |  0.86    Ca    8.9      11 Jun 2021 06:44  Phos  3.6     06-11  Mg     2.0     06-11            CAPILLARY BLOOD GLUCOSE          COVID-19 PCR: Salo (08 Jun 2021 01:14)      RADIOLOGY & ADDITIONAL TESTS: Reviewed.       GERALD MUHAMMAD, 71y, Male  MRN-[id number]  Patient is a 71y old  Male who presents with a chief complaint of L humerus fracture (11 Jun 2021 08:13)      OVERNIGHT EVENTS: JAMEY.    SUBJECTIVE: Pt seen and examined at bedside this AM. Feels well. Arm is still sore. Still complaining of increased frequency but no dysuria.  Denies fevers, chills, HA, chest pain, sob, nausea, vomiting, abdominal pain, diarrhea, constipation, dysuria.     12 Point ROS Negative unless noted otherwise above.  -------------------------------------------------------------------------------  VITAL SIGNS:  Vital Signs Last 24 Hrs  T(C): 36.7 (11 Jun 2021 08:52), Max: 37.1 (10 Louis 2021 13:08)  T(F): 98 (11 Jun 2021 08:52), Max: 98.8 (10 Louis 2021 13:08)  HR: 83 (11 Jun 2021 08:52) (75 - 83)  BP: 130/68 (11 Jun 2021 08:52) (121/65 - 154/98)  BP(mean): --  RR: 17 (11 Jun 2021 08:52) (17 - 19)  SpO2: 98% (11 Jun 2021 08:52) (95% - 98%)  I&O's Summary    10 Louis 2021 07:01  -  11 Jun 2021 07:00  --------------------------------------------------------  IN: 0 mL / OUT: 850 mL / NET: -850 mL        PHYSICAL EXAM:    General: NAD, male   HEENT: NC/AT;anicteric sclera; moist mucosal membranes.  Neck: supple, trachea midline  Cardiovascular: RRR, +S1/S2; NO M/R/G  Respiratory: mild inspiratory crackles bilateral lower extremities.   Gastrointestinal: soft, NT/ND; +BSx4  Extremities: WWP; 2+ edema to shin bilateral lower extremities with dry cracked skin.   Vascular: 2+ radial, L  Neurological: AAOx3; no focal deficits    ALLERGIES:  Allergies    penicillin (Unknown)    Intolerances        MEDICATIONS:  MEDICATIONS  (STANDING):  enoxaparin Injectable 40 milliGRAM(s) SubCutaneous every 24 hours  hydrochlorothiazide 25 milliGRAM(s) Oral every 24 hours  methadone    Tablet 180 milliGRAM(s) Oral every 24 hours  NIFEdipine XL 60 milliGRAM(s) Oral daily  senna 2 Tablet(s) Oral at bedtime  tamsulosin 0.4 milliGRAM(s) Oral every 12 hours  trimethoprim  160 mG/sulfamethoxazole 800 mG 1 Tablet(s) Oral two times a day    MEDICATIONS  (PRN):  acetaminophen   Tablet .. 650 milliGRAM(s) Oral every 6 hours PRN Mild Pain (1 - 3)      -------------------------------------------------------------------------------  LABS:                        10.1   7.54  )-----------( 142      ( 11 Jun 2021 06:44 )             32.6     06-11    129<L>  |  91<L>  |  16  ----------------------------<  87  4.1   |  28  |  0.86    Ca    8.9      11 Jun 2021 06:44  Phos  3.6     06-11  Mg     2.0     06-11            CAPILLARY BLOOD GLUCOSE          COVID-19 PCR: NotDetec (08 Jun 2021 01:14)      RADIOLOGY & ADDITIONAL TESTS: Reviewed.

## 2021-06-11 NOTE — PROGRESS NOTE ADULT - SUBJECTIVE AND OBJECTIVE BOX
GERALD MUHAMMAD, 71y, Male  [id number]  Patient is a 71y old  Male who presents with a chief complaint of L humerus fracture (11 Jun 2021 08:57)      OVERNIGHT EVENTS:     SUBJECTIVE:    12 Point ROS Negative unless noted otherwise above.  -------------------------------------------------------------------------------  VITAL SIGNS:  Vital Signs Last 24 Hrs  T(C): 36.7 (11 Jun 2021 08:52), Max: 37.1 (10 Louis 2021 13:08)  T(F): 98 (11 Jun 2021 08:52), Max: 98.8 (10 Louis 2021 13:08)  HR: 83 (11 Jun 2021 08:52) (75 - 83)  BP: 130/68 (11 Jun 2021 08:52) (121/65 - 154/98)  BP(mean): --  RR: 17 (11 Jun 2021 08:52) (17 - 19)  SpO2: 98% (11 Jun 2021 08:52) (95% - 98%)  I&O's Summary    10 Louis 2021 07:01  -  11 Jun 2021 07:00  --------------------------------------------------------  IN: 0 mL / OUT: 850 mL / NET: -850 mL        PHYSICAL EXAM:    General: NAD, well developed  HEENT: NC/AT; EOMI, PERRLA, anicteric sclera; moist mucosal membranes.  Neck: supple, trachea midline  Cardiovascular: RRR, +S1/S2; NO M/R/G  Respiratory: CTA B/L; no W/R/R  Gastrointestinal: soft, NT/ND; +BSx4  Extremities: WWP; no edema or cyanosis  Vascular: 2+ radial, DP/PT pulses B/L  Neurological: AAOx3; no focal deficits    ALLERGIES:  Allergies    penicillin (Unknown)    Intolerances        MEDICATIONS:  MEDICATIONS  (STANDING):  enoxaparin Injectable 40 milliGRAM(s) SubCutaneous every 24 hours  methadone    Tablet 180 milliGRAM(s) Oral every 24 hours  NIFEdipine XL 60 milliGRAM(s) Oral daily  senna 2 Tablet(s) Oral at bedtime  tamsulosin 0.4 milliGRAM(s) Oral every 12 hours  trimethoprim  160 mG/sulfamethoxazole 800 mG 1 Tablet(s) Oral every 12 hours    MEDICATIONS  (PRN):  acetaminophen   Tablet .. 650 milliGRAM(s) Oral every 6 hours PRN Mild Pain (1 - 3)      -------------------------------------------------------------------------------  LABS:                        10.1   7.54  )-----------( 142      ( 11 Jun 2021 06:44 )             32.6     06-11    129<L>  |  91<L>  |  16  ----------------------------<  87  4.1   |  28  |  0.86    Ca    8.9      11 Jun 2021 06:44  Phos  3.6     06-11  Mg     2.0     06-11            CAPILLARY BLOOD GLUCOSE          COVID-19 PCR: Salo (08 Jun 2021 01:14)      RADIOLOGY & ADDITIONAL TESTS: Reviewed.

## 2021-06-11 NOTE — PROGRESS NOTE ADULT - ATTENDING COMMENTS
71 year old Male with PMHx HTN and childhood asthma who presents with a chief complaint of L arm pain after fall in the shower. Found to have a left humeral angulated and diplaced comminuted fracture. Now s/p partial reduction by orthopedic surgery team with plan for conservative management pending HEVER placement course c/b enterbacter cloacae UTI. Cardiology consulted with concern for volume overload.    #Volume status   TTE with the followin. Normal left and right ventricular size and systolic function.   3. Severely dilated left atrium.   4. Mildly dilated right atrium.   5. No significant valvular disease.   6. Pulmonary artery systolic pressure is 31 mmHg.   7. No pericardial effusion.    Mild lower extremity edema now improved with IV lasix, lungs CTA. pr-.   - please stop HCTZ and start PO lasix 20mg daily     Please have the patient follow up with CRYSTAL Larson NP 1-2 weeks after discharge.     discussed at length with cardiology fellow  TOPHER Coats

## 2021-06-11 NOTE — PROGRESS NOTE ADULT - ATTENDING COMMENTS
- Treating UTI with bactrim (enterobacter sensitive to bactrim)   - humeral fracture-> oneil brace in place. Has f/u appt with orthopedics  # Hyponatremia   - Switched HCTZ to furosemide 20mg qd , f/u repeat BMP     Dispo: pending auth

## 2021-06-11 NOTE — PROGRESS NOTE ADULT - ASSESSMENT
71 year old Male with PMHx HTN and childhood asthma who presents with a chief complaint of L arm pain after fall in the shower. Found to have a left humeral angulated and diplaced comminuted fracture. Now s/p partial reduction by orthopedic surgery team with plan for conservative management pending HEVER placement course c/b enterbacter cloacae UTI. Cardiology consulted with concern for volume overload.    #Volume status   TTE with the followin. Normal left and right ventricular size and systolic function.   3. Severely dilated left atrium.   4. Mildly dilated right atrium.   5. No significant valvular disease.   6. Pulmonary artery systolic pressure is 31 mmHg.   7. No pericardial effusion.    Mild lower extremity edema, lungs CTA. pr-.       *INCOMPLETE  71 year old Male with PMHx HTN and childhood asthma who presents with a chief complaint of L arm pain after fall in the shower. Found to have a left humeral angulated and diplaced comminuted fracture. Now s/p partial reduction by orthopedic surgery team with plan for conservative management pending HEVER placement course c/b enterbacter cloacae UTI. Cardiology consulted with concern for volume overload.    #Volume status   TTE with the followin. Normal left and right ventricular size and systolic function.   3. Severely dilated left atrium.   4. Mildly dilated right atrium.   5. No significant valvular disease.   6. Pulmonary artery systolic pressure is 31 mmHg.   7. No pericardial effusion.    Mild lower extremity edema now improved with IV lasix, lungs CTA. pr-.   - please stop HCTZ and start PO lasix 20mg daily     Please have the patient follow up with CRYSTAL Larson NP 1-2 weeks after discharge.

## 2021-06-11 NOTE — PROGRESS NOTE ADULT - SUBJECTIVE AND OBJECTIVE BOX
INTERVAL EVENTS: JAMEY     PAST MEDICAL & SURGICAL HISTORY:  Asthma    HTN (hypertension)    MEDICATIONS  (STANDING):  enoxaparin Injectable 40 milliGRAM(s) SubCutaneous every 24 hours  hydrochlorothiazide 25 milliGRAM(s) Oral every 24 hours  methadone    Tablet 180 milliGRAM(s) Oral every 24 hours  NIFEdipine XL 60 milliGRAM(s) Oral daily  senna 2 Tablet(s) Oral at bedtime  tamsulosin 0.4 milliGRAM(s) Oral every 12 hours  trimethoprim  160 mG/sulfamethoxazole 800 mG 1 Tablet(s) Oral two times a day    MEDICATIONS  (PRN):  acetaminophen   Tablet .. 650 milliGRAM(s) Oral every 6 hours PRN Mild Pain (1 - 3)    Vital Signs Last 24 Hrs  T(C): 36.8 (11 Jun 2021 06:14), Max: 37.1 (10 Louis 2021 13:08)  T(F): 98.2 (11 Jun 2021 06:14), Max: 98.8 (10 Louis 2021 13:08)  HR: 77 (11 Jun 2021 06:14) (75 - 78)  BP: 154/98 (11 Jun 2021 06:14) (121/65 - 154/98)  BP(mean): --  RR: 18 (11 Jun 2021 06:14) (17 - 19)  SpO2: 97% (11 Jun 2021 06:14) (95% - 97%)    PHYSICAL EXAM:  GEN: NAD  HEENT: EOMI   RESP: CTA b/l  CV: RRR. Normal S1/S2. No m/r/g.  ABD: soft, non-distended  EXT: No edema   NEURO: alert and attentive    LABS:                        10.1   7.54  )-----------( 142      ( 11 Jun 2021 06:44 )             32.6     06-11    129<L>  |  91<L>  |  16  ----------------------------<  87  4.1   |  28  |  0.86    Ca    8.9      11 Jun 2021 06:44  Phos  3.6     06-11  Mg     2.0     06-11              I&O's Summary    10 Louis 2021 07:01  -  11 Jun 2021 07:00  --------------------------------------------------------  IN: 0 mL / OUT: 850 mL / NET: -850 mL

## 2021-06-11 NOTE — PROGRESS NOTE ADULT - SUBJECTIVE AND OBJECTIVE BOX
Physical Medicine and Rehabilitation Progress Note:    Patient is a 71y old  Male who presents with a chief complaint of L humerus fracture (2021 08:57)      HPI:  71 year old Male with PMHx HTN, childhood asthma, methadone dependence who presents with a chief complaint of L arm pain after fall in the shower. States he was at his sister-in-law's house and used her bathtub around 6-7pm yesterday. There was no railing for support and slipped while showering. Says he fell backwards with his left hand outstretched and experienced immediate 10/10 pain and noticed left upper extremity deformity. Unsure if he hit his head, but denied any loss of consciousness. His wife heard him in pain and entered the bathroom after which EMS was called. Patient possibly a poor historian given that he denied any left arm trauma, but noted to have distal humerus hardware on admission imaging. Does admit to right shoulder dislocation in the distant past that was reduced. At this moment, his LUE pain is 9/10 and diffuse but denies any numbness or tingling. Currently denies any other MSK related symptoms aside from the left upper extremity. Of note, he denies any dysuria or urethral discharge but admits to polyuria for the past one week. On further ROS, denies fevers, chills, N/V/D/C, CP, SOB, abdominal pain, leg swelling (although exam notable for swelling).    ED Course  Vitals: 98.5F (oral), HR 92, /81, 97% on RA, RR 16  Labs: notable for Hgb 11.0/Hct 36.5; UA - positive nitrites and small LE, trace blood, >10 WBC, +bacteria;   Imagin) XR humerus and shoulder, left - Humeral shaft angulated displaced comminuted fracture. Chronic rotator cuff injury. Shoulder and AC joint degenerative changes, greater humeral tuberosity peritendinitis. Bankart lesion. Distal humeral/condylar fixation plates and screws  2) XR forearm, left - Elbow soft tissue swelling. No acute fracture.. Wrist and elbow degenerative arthritis. Distal humeral/condylar fixation plates and screws  3) CXR - Cardiomegaly. Pulmonary vascular congestion. Scoliosis Left humeral shaft fracture, fixation plates and screws. Bilateral shoulder degenerative changes. Partially visualized right humeral neck old traumatic changes.  Management: dilaudid 1mg IVP x1, morphine 4mg IVP x1, bactrim DS PO x1 (2021 08:16)                            10.1   7.54  )-----------( 142      ( 2021 06:44 )             32.6       06-11    129<L>  |  91<L>  |  16  ----------------------------<  87  4.1   |  28  |  0.86    Ca    8.9      2021 06:44  Phos  3.6     06-11  Mg     2.0     06-11      Vital Signs Last 24 Hrs  T(C): 36.1 (2021 12:49), Max: 37.1 (10 Louis 2021 13:08)  T(F): 97 (2021 12:49), Max: 98.8 (10 Louis 2021 13:08)  HR: 80 (2021 12:49) (75 - 83)  BP: 123/70 (2021 12:49) (121/65 - 154/98)  BP(mean): --  RR: 17 (2021 12:49) (17 - 19)  SpO2: 97% (2021 12:49) (95% - 98%)    MEDICATIONS  (STANDING):  enoxaparin Injectable 40 milliGRAM(s) SubCutaneous every 24 hours  methadone    Tablet 180 milliGRAM(s) Oral every 24 hours  NIFEdipine XL 60 milliGRAM(s) Oral daily  senna 2 Tablet(s) Oral at bedtime  tamsulosin 0.4 milliGRAM(s) Oral every 12 hours  trimethoprim  160 mG/sulfamethoxazole 800 mG 1 Tablet(s) Oral every 12 hours    MEDICATIONS  (PRN):  acetaminophen   Tablet .. 650 milliGRAM(s) Oral every 6 hours PRN Mild Pain (1 - 3)    Currently Undergoing Physical/ Occupational Therapy at bedside.    Functional Status Assessment:       Cognitive/Perceptual/Neuro  Level of Consciousness: alert  Arousal Level: arouses to touch/gentle shaking  Speech: clear  Mood/Behavior: calm;  cooperative    Language Assistance  Preferred Language to Address Healthcare Preferred Language to Address Healthcare: English    Therapeutic Interventions      Bed Mobility  Bed Mobility Training Rolling/Turning: contact guard;  verbal cues;  nonverbal cues (demo/gestures);  1 person assist;  bed rails;  HOB elevated to 30 degrees  Bed Mobility Training Scooting: minimum assist (75% patient effort);  verbal cues;  nonverbal cues (demo/gestures);  2 person assist;  contact guard;  Pt able to scoot senior care to EOB with CGA and then required min A x 2 to scoot completely to EOB  Bed Mobility Training Supine-to-Sit: contact guard;  nonverbal cues (demo/gestures);  verbal cues;  HOB elevated to 30 degrees;  bed rails  Bed Mobility Training Limitations: decreased ability to use arms for pushing/pulling;  decreased ability to use legs for bridging/pushing;  impaired ability to control trunk for mobility;  decreased ROM;  decreased strength;  impaired balance;  impaired postural control;  pain;  LUE NWB     Sit-Stand Transfer Training  Transfer Training Sit-to-Stand Transfer: no AD;  LUE NWB   hand over hand;  contact guard;  PT provided Hand Held Assist on RUE with CG around waist ;  minimum assist (75% patient effort)  Transfer Training Stand-to-Sit Transfer: contact guard;  minimum assist (75% patient effort);  verbal cues;  nonverbal cues (demo/gestures);  LUE NWB   no AD;  PT provided Hand Held Assist to RUE and CG around waist   Sit-to-Stand Transfer Training Transfer Safety Analysis: decreased balance;  decreased weight-shifting ability;  decreased strength;  impaired balance;  impaired postural control;  pain;  no AD    Gait Training  Gait Training: minimum assist (75% patient effort);  hand over hand;  1 person assist;  nonverbal cues (demo/gestures);  verbal cues;  PT provided Hand Held Assist to RUE and CG around waist;  LUE NWB   no AD;  75 feet  Gait Analysis: 2-point gait   decreased hip/knee flexion;  decreased sudhir;  decreased toe clearance;  decreased weight-shifting ability;  decreased stride length;  decreased step length;  step length improved since last session but still inconsistent;  decreased strength;  impaired postural control;  impaired balance;  pain;  75 feet;  no AD  Gait Number of Times:: x 1    Therapeutic Exercise  Therapeutic Exercise Detail: Semi-supine B/L Hip ABD/ADD 3x, Semi-supine, B/L Heel Slides 3x, Seated Marching at HOB 10x           PM&R Impression: as above    Current Disposition Plan Recommendations:    subacute rehab placement

## 2021-06-11 NOTE — PROGRESS NOTE ADULT - ASSESSMENT
per Internal Medicine    71 year old Male with PMHx HTN and childhood asthma who presents with a chief complaint of L arm pain after fall in the shower. Found to have a left humeral angulated and diplaced comminuted fracture. Now s/p partial reduction by orthopedic surgery team with plan for conservative management pending HEVER placement course c/b enterbacter cloacae UTI.       Problem/Plan - 1:  ·  Problem: Humerus fracture.  Plan: Angulated and displaced comminuted fracture after fall on outstretched hand in the shower on June 7th. Imaging noted for said finding along with greater humeral tuberosity peritendonitis, bankart lesion, distal humeral/condylar fixation plates and screws.   - ortho team attempted partial reduction. Per their impression, plan for conservative management with coaptation splint. Esqueda brace placed.  - no acute operative intervention planned  - Per ortho, post splint x-rays reviewed and fracture with acceptable alignment  - currently denies any numbness, tingling, and weakness of LUE  - PRN tylenol (mild), conservative hold off opioids given he is on methadone  - ice packs   - PT eval recc HEVER  - f/u outpatient with Dr. Donaldson done.     Problem/Plan - 2:  ·  Problem: UTI (urinary tract infection).  Plan: Urinalysis positive for nitrites, small LE, >10 WBC, + bacteria. Patient denies any dysuria or urethral discharge, but admits to polyuria within the past week. Denies any fever, chills, flank pain, confusion.   - growing Enterobacter cloacae in culture, switched antibiotics to bactrim BID 5 days.  - sensitive to bactrim, will continue on this course.     Problem/Plan - 3:  ·  Problem: HTN (hypertension).  Plan: BP on admission 154/81. P  - C/w procardia 60mg daily as well.   - Given peripheral edema will dose for HCTZ 25 mg PO daily for now  - f/u HF consult for Echo with suggestive grade 1 diastolic dysfunction.       #BPH  - c/w home tamsulosin 0.4mg q12h.     Problem/Plan - 4:  ·  Problem: Methadone dependence.  Plan: Says he is on methadone 180mg for which he receives his dose at the [facility] on [address].  - Confirmed dose with clinic (tel: [phone]).     Problem/Plan - 5:  ·  Problem: R/O Heart failure.  Plan: Workup notable for cardiomegaly with pulmonary vascular congestion and bilateral 2+ pitting edema from the feet to mid-calves. Patient unsure of history of heart failure.   - TTE suggestive of grade 1 diastolic dysfunction. normal LV/RV function. severe dilated LA.  - f/u HF consult recs.  - d/c hctz and start lasix 20mg PO daily.   - Rpt CXR this morning improved from admission.     Problem/Plan - 6:  Problem: Asthma. Plan: History of childhood asthma for which he used to take ventolin inhaler as needed and nasal drops. Has not used rescue inhaler within the past year.   - continue to monitor.    Problem/Plan - 7:  ·  Problem: Anemia.  Plan: Hgb 11.0/Hct 36.5 on admission with unclear baseline. Denies any s/s bleeding including hemoptysis, hematuria, dark tarry stools, melena.   - active T&S  - transfuse for Hgb < 7.     Problem/Plan - 8:  ·  Problem: Nutrition, metabolism, and development symptoms.  Plan: F: tolerating PO, no IVF  E: replete K<4, Mg<2  N: Dash/TLC  VTE Prophylaxis: Lovenox 40 Q24H  GI: not needed  C: Full Code  D: RMF.

## 2021-06-12 LAB
ANION GAP SERPL CALC-SCNC: 10 MMOL/L — SIGNIFICANT CHANGE UP (ref 5–17)
BUN SERPL-MCNC: 24 MG/DL — HIGH (ref 7–23)
CALCIUM SERPL-MCNC: 8.9 MG/DL — SIGNIFICANT CHANGE UP (ref 8.4–10.5)
CHLORIDE SERPL-SCNC: 96 MMOL/L — SIGNIFICANT CHANGE UP (ref 96–108)
CO2 SERPL-SCNC: 27 MMOL/L — SIGNIFICANT CHANGE UP (ref 22–31)
CREAT SERPL-MCNC: 1.11 MG/DL — SIGNIFICANT CHANGE UP (ref 0.5–1.3)
GLUCOSE SERPL-MCNC: 88 MG/DL — SIGNIFICANT CHANGE UP (ref 70–99)
HCT VFR BLD CALC: 34.5 % — LOW (ref 39–50)
HGB BLD-MCNC: 10.7 G/DL — LOW (ref 13–17)
MAGNESIUM SERPL-MCNC: 2.3 MG/DL — SIGNIFICANT CHANGE UP (ref 1.6–2.6)
MCHC RBC-ENTMCNC: 26.8 PG — LOW (ref 27–34)
MCHC RBC-ENTMCNC: 31 GM/DL — LOW (ref 32–36)
MCV RBC AUTO: 86.3 FL — SIGNIFICANT CHANGE UP (ref 80–100)
NRBC # BLD: 0 /100 WBCS — SIGNIFICANT CHANGE UP (ref 0–0)
PHOSPHATE SERPL-MCNC: 3.7 MG/DL — SIGNIFICANT CHANGE UP (ref 2.5–4.5)
PLATELET # BLD AUTO: 158 K/UL — SIGNIFICANT CHANGE UP (ref 150–400)
POTASSIUM SERPL-MCNC: 4.3 MMOL/L — SIGNIFICANT CHANGE UP (ref 3.5–5.3)
POTASSIUM SERPL-SCNC: 4.3 MMOL/L — SIGNIFICANT CHANGE UP (ref 3.5–5.3)
RBC # BLD: 4 M/UL — LOW (ref 4.2–5.8)
RBC # FLD: 14 % — SIGNIFICANT CHANGE UP (ref 10.3–14.5)
SODIUM SERPL-SCNC: 133 MMOL/L — LOW (ref 135–145)
WBC # BLD: 8.24 K/UL — SIGNIFICANT CHANGE UP (ref 3.8–10.5)
WBC # FLD AUTO: 8.24 K/UL — SIGNIFICANT CHANGE UP (ref 3.8–10.5)

## 2021-06-12 PROCEDURE — 99232 SBSQ HOSP IP/OBS MODERATE 35: CPT | Mod: GC

## 2021-06-12 RX ORDER — SODIUM CHLORIDE 9 MG/ML
500 INJECTION INTRAMUSCULAR; INTRAVENOUS; SUBCUTANEOUS
Refills: 0 | Status: DISCONTINUED | OUTPATIENT
Start: 2021-06-12 | End: 2021-06-12

## 2021-06-12 RX ADMIN — SENNA PLUS 2 TABLET(S): 8.6 TABLET ORAL at 23:51

## 2021-06-12 RX ADMIN — Medication 1 TABLET(S): at 06:11

## 2021-06-12 RX ADMIN — TAMSULOSIN HYDROCHLORIDE 0.4 MILLIGRAM(S): 0.4 CAPSULE ORAL at 17:32

## 2021-06-12 RX ADMIN — Medication 1 TABLET(S): at 17:32

## 2021-06-12 RX ADMIN — ENOXAPARIN SODIUM 40 MILLIGRAM(S): 100 INJECTION SUBCUTANEOUS at 07:53

## 2021-06-12 RX ADMIN — TAMSULOSIN HYDROCHLORIDE 0.4 MILLIGRAM(S): 0.4 CAPSULE ORAL at 06:11

## 2021-06-12 RX ADMIN — SODIUM CHLORIDE 100 MILLILITER(S): 9 INJECTION INTRAMUSCULAR; INTRAVENOUS; SUBCUTANEOUS at 10:33

## 2021-06-12 RX ADMIN — Medication 650 MILLIGRAM(S): at 23:56

## 2021-06-12 RX ADMIN — Medication 60 MILLIGRAM(S): at 06:11

## 2021-06-12 RX ADMIN — METHADONE HYDROCHLORIDE 180 MILLIGRAM(S): 40 TABLET ORAL at 07:53

## 2021-06-12 NOTE — PROGRESS NOTE ADULT - ATTENDING COMMENTS
Pt clinically stable, pain well controlled; pending auth for HEVER; Cr. increased today likely in setting of diuresis; Na+ improved to 133 with change to Lasix

## 2021-06-12 NOTE — PROGRESS NOTE ADULT - SUBJECTIVE AND OBJECTIVE BOX
GERALD MUHAMMAD, 71y, Male  [id number]  Patient is a 71y old  Male who presents with a chief complaint of L humerus fracture (11 Jun 2021 13:06)      OVERNIGHT EVENTS:     SUBJECTIVE:    12 Point ROS Negative unless noted otherwise above.  -------------------------------------------------------------------------------  VITAL SIGNS:  Vital Signs Last 24 Hrs  T(C): 36.7 (12 Jun 2021 06:16), Max: 36.8 (11 Jun 2021 20:35)  T(F): 98.1 (12 Jun 2021 06:16), Max: 98.2 (11 Jun 2021 20:35)  HR: 75 (12 Jun 2021 06:16) (75 - 85)  BP: 135/81 (12 Jun 2021 06:16) (120/70 - 135/81)  BP(mean): --  RR: 18 (12 Jun 2021 06:16) (17 - 18)  SpO2: 95% (12 Jun 2021 06:16) (95% - 97%)  I&O's Summary      PHYSICAL EXAM:    General: NAD, well developed  HEENT: NC/AT; EOMI, PERRLA, anicteric sclera; moist mucosal membranes.  Neck: supple, trachea midline  Cardiovascular: RRR, +S1/S2; NO M/R/G  Respiratory: CTA B/L; no W/R/R  Gastrointestinal: soft, NT/ND; +BSx4  Extremities: WWP; no edema or cyanosis  Vascular: 2+ radial, DP/PT pulses B/L  Neurological: AAOx3; no focal deficits    ALLERGIES:  Allergies    penicillin (Unknown)    Intolerances        MEDICATIONS:  MEDICATIONS  (STANDING):  enoxaparin Injectable 40 milliGRAM(s) SubCutaneous every 24 hours  furosemide    Tablet 20 milliGRAM(s) Oral daily  methadone    Tablet 180 milliGRAM(s) Oral every 24 hours  NIFEdipine XL 60 milliGRAM(s) Oral daily  senna 2 Tablet(s) Oral at bedtime  tamsulosin 0.4 milliGRAM(s) Oral every 12 hours  trimethoprim  160 mG/sulfamethoxazole 800 mG 1 Tablet(s) Oral every 12 hours    MEDICATIONS  (PRN):  acetaminophen   Tablet .. 650 milliGRAM(s) Oral every 6 hours PRN Mild Pain (1 - 3)      -------------------------------------------------------------------------------  LABS:                        10.7   8.24  )-----------( 158      ( 12 Jun 2021 08:21 )             34.5     06-12    133<L>  |  96  |  24<H>  ----------------------------<  88  4.3   |  27  |  1.11    Ca    8.9      12 Jun 2021 08:21  Phos  3.7     06-12  Mg     2.3     06-12            CAPILLARY BLOOD GLUCOSE          COVID-19 PCR: Veritotec (08 Jun 2021 01:14)      RADIOLOGY & ADDITIONAL TESTS: Reviewed.       GERALD MUHAMMAD, 71y, Male  [id number]  Patient is a 71y old  Male who presents with a chief complaint of L humerus fracture (11 Jun 2021 13:06)      OVERNIGHT EVENTS: JAMEY.    SUBJECTIVE: Pt seen and examined at bedside this AM. still with urinary frequency. no dysuria.    12 Point ROS Negative unless noted otherwise above.  -------------------------------------------------------------------------------  VITAL SIGNS:  Vital Signs Last 24 Hrs  T(C): 36.7 (12 Jun 2021 06:16), Max: 36.8 (11 Jun 2021 20:35)  T(F): 98.1 (12 Jun 2021 06:16), Max: 98.2 (11 Jun 2021 20:35)  HR: 75 (12 Jun 2021 06:16) (75 - 85)  BP: 135/81 (12 Jun 2021 06:16) (120/70 - 135/81)  BP(mean): --  RR: 18 (12 Jun 2021 06:16) (17 - 18)  SpO2: 95% (12 Jun 2021 06:16) (95% - 97%)  I&O's Summary      PHYSICAL EXAM:  Gen: NAD, LUE in cast and brace  HEENT: NC/AT; moist mucosal membranes.  Neck: supple, trachea midline  Cardiovascular: RRR, +S1/S2; NO M/R/G  Respiratory: CTAB   Gastrointestinal: soft, NT/ND; +BSx4  Extremities: WWP; 2+ edema to shin bilateral lower extremities with dry cracked skin.   Vascular: 2+ radial, L  Neurological: AAOx3; no focal def    ALLERGIES:  Allergies    penicillin (Unknown)    Intolerances        MEDICATIONS:  MEDICATIONS  (STANDING):  enoxaparin Injectable 40 milliGRAM(s) SubCutaneous every 24 hours  furosemide    Tablet 20 milliGRAM(s) Oral daily  methadone    Tablet 180 milliGRAM(s) Oral every 24 hours  NIFEdipine XL 60 milliGRAM(s) Oral daily  senna 2 Tablet(s) Oral at bedtime  tamsulosin 0.4 milliGRAM(s) Oral every 12 hours  trimethoprim  160 mG/sulfamethoxazole 800 mG 1 Tablet(s) Oral every 12 hours    MEDICATIONS  (PRN):  acetaminophen   Tablet .. 650 milliGRAM(s) Oral every 6 hours PRN Mild Pain (1 - 3)      -------------------------------------------------------------------------------  LABS:                        10.7   8.24  )-----------( 158      ( 12 Jun 2021 08:21 )             34.5     06-12    133<L>  |  96  |  24<H>  ----------------------------<  88  4.3   |  27  |  1.11    Ca    8.9      12 Jun 2021 08:21  Phos  3.7     06-12  Mg     2.3     06-12            CAPILLARY BLOOD GLUCOSE          COVID-19 PCR: NotDetec (08 Jun 2021 01:14)      RADIOLOGY & ADDITIONAL TESTS: Reviewed.

## 2021-06-13 VITALS
DIASTOLIC BLOOD PRESSURE: 74 MMHG | TEMPERATURE: 98 F | HEART RATE: 75 BPM | RESPIRATION RATE: 18 BRPM | SYSTOLIC BLOOD PRESSURE: 134 MMHG | OXYGEN SATURATION: 98 %

## 2021-06-13 DIAGNOSIS — N17.9 ACUTE KIDNEY FAILURE, UNSPECIFIED: ICD-10-CM

## 2021-06-13 DIAGNOSIS — E87.1 HYPO-OSMOLALITY AND HYPONATREMIA: ICD-10-CM

## 2021-06-13 LAB
ALBUMIN SERPL ELPH-MCNC: 3.8 G/DL — SIGNIFICANT CHANGE UP (ref 3.3–5)
ALP SERPL-CCNC: 91 U/L — SIGNIFICANT CHANGE UP (ref 40–120)
ALT FLD-CCNC: 13 U/L — SIGNIFICANT CHANGE UP (ref 10–45)
ANION GAP SERPL CALC-SCNC: 11 MMOL/L — SIGNIFICANT CHANGE UP (ref 5–17)
AST SERPL-CCNC: 21 U/L — SIGNIFICANT CHANGE UP (ref 10–40)
BILIRUB SERPL-MCNC: 0.4 MG/DL — SIGNIFICANT CHANGE UP (ref 0.2–1.2)
BUN SERPL-MCNC: 21 MG/DL — SIGNIFICANT CHANGE UP (ref 7–23)
CALCIUM SERPL-MCNC: 9.8 MG/DL — SIGNIFICANT CHANGE UP (ref 8.4–10.5)
CHLORIDE SERPL-SCNC: 96 MMOL/L — SIGNIFICANT CHANGE UP (ref 96–108)
CO2 SERPL-SCNC: 26 MMOL/L — SIGNIFICANT CHANGE UP (ref 22–31)
CREAT SERPL-MCNC: 1.1 MG/DL — SIGNIFICANT CHANGE UP (ref 0.5–1.3)
GLUCOSE SERPL-MCNC: 125 MG/DL — HIGH (ref 70–99)
POTASSIUM SERPL-MCNC: 4.8 MMOL/L — SIGNIFICANT CHANGE UP (ref 3.5–5.3)
POTASSIUM SERPL-SCNC: 4.8 MMOL/L — SIGNIFICANT CHANGE UP (ref 3.5–5.3)
PROT SERPL-MCNC: 8.5 G/DL — HIGH (ref 6–8.3)
SODIUM SERPL-SCNC: 133 MMOL/L — LOW (ref 135–145)

## 2021-06-13 PROCEDURE — 87186 SC STD MICRODIL/AGAR DIL: CPT

## 2021-06-13 PROCEDURE — 86850 RBC ANTIBODY SCREEN: CPT

## 2021-06-13 PROCEDURE — 86769 SARS-COV-2 COVID-19 ANTIBODY: CPT

## 2021-06-13 PROCEDURE — 73110 X-RAY EXAM OF WRIST: CPT

## 2021-06-13 PROCEDURE — 73030 X-RAY EXAM OF SHOULDER: CPT

## 2021-06-13 PROCEDURE — 87522 HEPATITIS C REVRS TRNSCRPJ: CPT

## 2021-06-13 PROCEDURE — 36415 COLL VENOUS BLD VENIPUNCTURE: CPT

## 2021-06-13 PROCEDURE — 97530 THERAPEUTIC ACTIVITIES: CPT

## 2021-06-13 PROCEDURE — 80053 COMPREHEN METABOLIC PANEL: CPT

## 2021-06-13 PROCEDURE — 85610 PROTHROMBIN TIME: CPT

## 2021-06-13 PROCEDURE — 96374 THER/PROPH/DIAG INJ IV PUSH: CPT

## 2021-06-13 PROCEDURE — 87521 HEPATITIS C PROBE&RVRS TRNSC: CPT

## 2021-06-13 PROCEDURE — 86901 BLOOD TYPING SEROLOGIC RH(D): CPT

## 2021-06-13 PROCEDURE — 83880 ASSAY OF NATRIURETIC PEPTIDE: CPT

## 2021-06-13 PROCEDURE — 85025 COMPLETE CBC W/AUTO DIFF WBC: CPT

## 2021-06-13 PROCEDURE — 84100 ASSAY OF PHOSPHORUS: CPT

## 2021-06-13 PROCEDURE — U0005: CPT

## 2021-06-13 PROCEDURE — 83935 ASSAY OF URINE OSMOLALITY: CPT

## 2021-06-13 PROCEDURE — 97535 SELF CARE MNGMENT TRAINING: CPT

## 2021-06-13 PROCEDURE — 87086 URINE CULTURE/COLONY COUNT: CPT

## 2021-06-13 PROCEDURE — 99233 SBSQ HOSP IP/OBS HIGH 50: CPT | Mod: GC

## 2021-06-13 PROCEDURE — 71045 X-RAY EXAM CHEST 1 VIEW: CPT

## 2021-06-13 PROCEDURE — 86900 BLOOD TYPING SEROLOGIC ABO: CPT

## 2021-06-13 PROCEDURE — 85027 COMPLETE CBC AUTOMATED: CPT

## 2021-06-13 PROCEDURE — 81001 URINALYSIS AUTO W/SCOPE: CPT

## 2021-06-13 PROCEDURE — 85730 THROMBOPLASTIN TIME PARTIAL: CPT

## 2021-06-13 PROCEDURE — 86803 HEPATITIS C AB TEST: CPT

## 2021-06-13 PROCEDURE — 82570 ASSAY OF URINE CREATININE: CPT

## 2021-06-13 PROCEDURE — 97116 GAIT TRAINING THERAPY: CPT

## 2021-06-13 PROCEDURE — 80048 BASIC METABOLIC PNL TOTAL CA: CPT

## 2021-06-13 PROCEDURE — U0003: CPT

## 2021-06-13 PROCEDURE — 84300 ASSAY OF URINE SODIUM: CPT

## 2021-06-13 PROCEDURE — 99285 EMERGENCY DEPT VISIT HI MDM: CPT | Mod: 25

## 2021-06-13 PROCEDURE — 97161 PT EVAL LOW COMPLEX 20 MIN: CPT

## 2021-06-13 PROCEDURE — 93306 TTE W/DOPPLER COMPLETE: CPT

## 2021-06-13 PROCEDURE — 73080 X-RAY EXAM OF ELBOW: CPT

## 2021-06-13 PROCEDURE — 83735 ASSAY OF MAGNESIUM: CPT

## 2021-06-13 PROCEDURE — 96375 TX/PRO/DX INJ NEW DRUG ADDON: CPT

## 2021-06-13 PROCEDURE — 73090 X-RAY EXAM OF FOREARM: CPT

## 2021-06-13 PROCEDURE — 73060 X-RAY EXAM OF HUMERUS: CPT

## 2021-06-13 RX ORDER — POLYETHYLENE GLYCOL 3350 17 G/17G
17 POWDER, FOR SOLUTION ORAL DAILY
Refills: 0 | Status: DISCONTINUED | OUTPATIENT
Start: 2021-06-13 | End: 2021-06-13

## 2021-06-13 RX ORDER — KETOROLAC TROMETHAMINE 30 MG/ML
15 SYRINGE (ML) INJECTION ONCE
Refills: 0 | Status: DISCONTINUED | OUTPATIENT
Start: 2021-06-13 | End: 2021-06-13

## 2021-06-13 RX ORDER — SENNA PLUS 8.6 MG/1
2 TABLET ORAL AT BEDTIME
Refills: 0 | Status: DISCONTINUED | OUTPATIENT
Start: 2021-06-13 | End: 2021-06-13

## 2021-06-13 RX ADMIN — POLYETHYLENE GLYCOL 3350 17 GRAM(S): 17 POWDER, FOR SOLUTION ORAL at 17:27

## 2021-06-13 RX ADMIN — ENOXAPARIN SODIUM 40 MILLIGRAM(S): 100 INJECTION SUBCUTANEOUS at 08:08

## 2021-06-13 RX ADMIN — Medication 15 MILLIGRAM(S): at 11:18

## 2021-06-13 RX ADMIN — TAMSULOSIN HYDROCHLORIDE 0.4 MILLIGRAM(S): 0.4 CAPSULE ORAL at 17:28

## 2021-06-13 RX ADMIN — TAMSULOSIN HYDROCHLORIDE 0.4 MILLIGRAM(S): 0.4 CAPSULE ORAL at 06:04

## 2021-06-13 RX ADMIN — Medication 20 MILLIGRAM(S): at 06:03

## 2021-06-13 RX ADMIN — METHADONE HYDROCHLORIDE 180 MILLIGRAM(S): 40 TABLET ORAL at 08:09

## 2021-06-13 RX ADMIN — Medication 60 MILLIGRAM(S): at 06:04

## 2021-06-13 RX ADMIN — Medication 1 TABLET(S): at 06:03

## 2021-06-13 RX ADMIN — Medication 15 MILLIGRAM(S): at 11:36

## 2021-06-13 NOTE — PROGRESS NOTE ADULT - PROBLEM SELECTOR PLAN 8
F: tolerating PO, no IVF  E: replete K<4, Mg<2  N: Dash/TLC  VTE Prophylaxis: Lovenox 40 Q24H  GI: not needed  C: Full Code  D: RMF
History of childhood asthma for which he used to take ventolin inhaler as needed and nasal drops. Has not used rescue inhaler within the past year.   - continue to monitor

## 2021-06-13 NOTE — PROGRESS NOTE ADULT - PROBLEM SELECTOR PLAN 7
Hgb 11.0/Hct 36.5 on admission with unclear baseline. Denies any s/s bleeding including hemoptysis, hematuria, dark tarry stools, melena.   - active T&S  - transfuse for Hgb < 7
Workup notable for cardiomegaly with pulmonary vascular congestion and bilateral 2+ pitting edema from the feet to mid-calves. Patient unsure of history of heart failure.   - TTE suggestive of grade 1 diastolic dysfunction. normal LV/RV function. severe dilated LA.  - f/u HF consult recs.  - d/c hctz and start lasix 20mg PO daily.   - Rpt CXR this morning improved from admission.
Hgb 11.0/Hct 36.5 on admission with unclear baseline. Denies any s/s bleeding including hemoptysis, hematuria, dark tarry stools, melena.   - active T&S  - transfuse for Hgb < 7

## 2021-06-13 NOTE — PROGRESS NOTE ADULT - PROBLEM SELECTOR PLAN 3
Improved w/ change from thiazide to Lasix
BP on admission 154/81. Possibly elevated from either underlying diagnosis and/or pain   - Added procardia 60mg daily as well.   - Given peripheral edema will dose for HCTZ 25 mg PO daily for now    #BPH  - c/w home tamsulosin 0.4mg q12h
BP on admission 154/81. P  - C/w procardia 60mg daily as well.   - Given peripheral edema will dose for HCTZ 25 mg PO daily for now  - f/u HF consult for Echo with suggestive grade 1 diastolic dysfunction.       #BPH  - c/w home tamsulosin 0.4mg q12h

## 2021-06-13 NOTE — PROGRESS NOTE ADULT - PROBLEM SELECTOR PROBLEM 2
UTI (urinary tract infection)
Humerus fracture

## 2021-06-13 NOTE — PROGRESS NOTE ADULT - PROBLEM SELECTOR PLAN 1
Angulated and displaced comminuted fracture after fall on outstretched hand in the shower on June 7th. Imaging noted for said finding along with greater humeral tuberosity peritendonitis, bankart lesion, distal humeral/condylar fixation plates and screws. Left elbow also with soft tissue swelling but no acute fracture.   - ortho team attempted partial reduction. Per their impression, plan for conservative management with coaptation splint and later transition to oneil/functional brace as an outpatient. Need to obtain brace for d/c?  - no acute operative intervention planned  - Per ortho, post splint x-rays reviewed and fracture with acceptable alignment  - currently denies any numbness, tingling, and weakness of LUE  - PRN tylenol (mild), conservative hold off opioids given he is on methadone  - ice packs   - PT eval   - f/u outpatient with Dr. Donaldson
Angulated and displaced comminuted fracture after fall on outstretched hand in the shower on June 7th. Imaging noted for said finding along with greater humeral tuberosity peritendonitis, bankart lesion, distal humeral/condylar fixation plates and screws.   - ortho team attempted partial reduction. Per their impression, plan for conservative management with coaptation splint. Esqueda brace placed.  - no acute operative intervention planned  - Per ortho, post splint x-rays reviewed and fracture with acceptable alignment  - currently denies any numbness, tingling, and weakness of LUE  - PRN tylenol (mild), conservative hold off opioids given he is on methadone  - ice packs   - PT eval   - f/u outpatient with Dr. Donaldson done.
Angulated and displaced comminuted fracture after fall on outstretched hand in the shower on June 7th. Imaging noted for said finding along with greater humeral tuberosity peritendonitis, bankart lesion, distal humeral/condylar fixation plates and screws.   - ortho team attempted partial reduction. Per their impression, plan for conservative management with coaptation splint. Esqueda brace placed.  - no acute operative intervention planned  - Per ortho, post splint x-rays reviewed and fracture with acceptable alignment  - currently denies any numbness, tingling, and weakness of LUE  - PRN tylenol (mild), conservative hold off opioids given he is on methadone  - ice packs   - PT eval recc HEVER  - f/u outpatient with Dr. Donaldson done.
Angulated and displaced comminuted fracture after fall on outstretched hand in the shower on June 7th. Imaging noted for said finding along with greater humeral tuberosity peritendonitis, bankart lesion, distal humeral/condylar fixation plates and screws.   - ortho team attempted partial reduction. Per their impression, plan for conservative management with coaptation splint. Esqueda brace placed.  - no acute operative intervention planned  - Per ortho, post splint x-rays reviewed and fracture with acceptable alignment  - currently denies any numbness, tingling, and weakness of LUE  - PRN tylenol (mild), conservative hold off opioids given he is on methadone  - ice packs   - PT eval recc HEVER  - f/u outpatient with Dr. Donaldson done.
Pt w/ increase of Cr. likely in setting of diuresis; will trend CMP today

## 2021-06-13 NOTE — PROGRESS NOTE ADULT - PROBLEM SELECTOR PLAN 6
History of childhood asthma for which he used to take ventolin inhaler as needed and nasal drops. Has not used rescue inhaler within the past year.   - continue to monitor
Says he is on methadone 180mg for which he receives his dose at the [facility] on [address].  - Confirmed dose with clinic (tel: [phone])
History of childhood asthma for which he used to take ventolin inhaler as needed and nasal drops. Has not used rescue inhaler within the past year.   - continue to monitor

## 2021-06-13 NOTE — PROGRESS NOTE ADULT - PROBLEM SELECTOR PROBLEM 8
Nutrition, metabolism, and development symptoms
Asthma

## 2021-06-13 NOTE — PROGRESS NOTE ADULT - PROBLEM SELECTOR PLAN 2
Angulated and displaced comminuted fracture after fall on outstretched hand in the shower on June 7th. Imaging noted for said finding along with greater humeral tuberosity peritendonitis, bankart lesion, distal humeral/condylar fixation plates and screws.   - ortho team attempted partial reduction. Per their impression, plan for conservative management with coaptation splint. Esqueda brace placed.  - no acute operative intervention planned  - Per ortho, post splint x-rays reviewed and fracture with acceptable alignment  - currently denies any numbness, tingling, and weakness of LUE  - PRN tylenol (mild), conservative hold off opioids given he is on methadone  - ice packs   - PT eval recc HEVER  - f/u outpatient with Dr. Donaldson done.
Urinalysis positive for nitrites, small LE, >10 WBC, + bacteria. Patient denies any dysuria or urethral discharge, but admits to polyuria within the past week. Denies any fever, chills, flank pain, confusion.   - growing Enterobacter cloacae in culture, switched antibiotics to bactrim BID 7 days.  - follow up sensitivities.
Urinalysis positive for nitrites, small LE, >10 WBC, + bacteria. Patient denies any dysuria or urethral discharge, but admits to polyuria within the past week. Denies any fever, chills, flank pain, confusion.   - growing Enterobacter cloacae in culture, switched antibiotics to bactrim BID 5 days.  - sensitive to bactrim, will continue on this course.
Urinalysis positive for nitrites, small LE, >10 WBC, + bacteria. Patient denies any dysuria or urethral discharge, but admits to polyuria within the past week. Denies any fever, chills, flank pain, confusion.   - growing Enterobacter cloacae in culture, switched antibiotics to bactrim BID 5 days.  - sensitive to bactrim, will continue on this course.
Urinalysis positive for nitrites, small LE, >10 WBC, + bacteria. Patient denies any dysuria or urethral discharge, but admits to polyuria within the past week. Denies any fever, chills, flank pain, confusion.   - s/p bactrim DS PO x1   - c/w CTX 1g daily x5 days (6/8 - )

## 2021-06-13 NOTE — PROGRESS NOTE ADULT - SUBJECTIVE AND OBJECTIVE BOX
GERALD MUHAMMAD, 71y, Male  MRN-[id number]  Patient is a 71y old  Male who presents with a chief complaint of L humerus fracture (11 Jun 2021 13:06)      OVERNIGHT EVENTS: JAMEY.    SUBJECTIVE: Pt seen and examined at bedside this AM. He has no acute complaints. Pain is better controlled. Denies CP, SOB. ROS is otherwise negative.     12 Point ROS Negative unless noted otherwise above.  -------------------------------------------------------------------------------  Vital Signs Last 24 Hrs  T(C): 36.9 (13 Jun 2021 05:27), Max: 37.3 (12 Jun 2021 22:30)  T(F): 98.5 (13 Jun 2021 05:27), Max: 99.2 (12 Jun 2021 22:30)  HR: 75 (13 Jun 2021 05:27) (75 - 94)  BP: 153/82 (13 Jun 2021 05:27) (147/77 - 153/82)  BP(mean): --  RR: 17 (13 Jun 2021 05:27) (16 - 17)  SpO2: 97% (13 Jun 2021 05:27) (97% - 98%)      PHYSICAL EXAM:  Gen: NAD, LUE in cast and brace  HEENT: NC/AT; moist mucosal membranes.  Neck: supple, trachea midline  Cardiovascular: RRR, +S1/S2; NO M/R/G  Respiratory: CTAB   Gastrointestinal: soft, NT/ND; +BSx4  Extremities: WWP; 2+ edema to shin bilateral lower extremities with dry cracked skin.   Vascular: 2+ radial, L  Neurological: AAOx3; no focal def  Psych: normal affect    ALLERGIES:  Allergies    penicillin (Unknown)    Intolerances      MEDICATIONS  (STANDING):  enoxaparin Injectable 40 milliGRAM(s) SubCutaneous every 24 hours  furosemide    Tablet 20 milliGRAM(s) Oral daily  methadone    Tablet 180 milliGRAM(s) Oral every 24 hours  NIFEdipine XL 60 milliGRAM(s) Oral daily  senna 2 Tablet(s) Oral at bedtime  tamsulosin 0.4 milliGRAM(s) Oral every 12 hours    MEDICATIONS  (PRN):  acetaminophen   Tablet .. 650 milliGRAM(s) Oral every 6 hours PRN Mild Pain (1 - 3)        -------------------------------------------------------------------------------  LABS:                                   10.7   8.24  )-----------( 158      ( 12 Jun 2021 08:21 )             34.5   06-12    133<L>  |  96  |  24<H>  ----------------------------<  88  4.3   |  27  |  1.11    Ca    8.9      12 Jun 2021 08:21  Phos  3.7     06-12  Mg     2.3     06-12                CAPILLARY BLOOD GLUCOSE          COVID-19 PCR: NotDetec (08 Jun 2021 01:14)      RADIOLOGY & ADDITIONAL TESTS: Reviewed.

## 2021-06-13 NOTE — PROGRESS NOTE ADULT - REASON FOR ADMISSION
L humerus fracture

## 2021-06-13 NOTE — PROGRESS NOTE ADULT - PROBLEM SELECTOR PROBLEM 4
UTI (urinary tract infection)
Methadone dependence

## 2021-06-13 NOTE — PROGRESS NOTE ADULT - PROBLEM SELECTOR PLAN 5
Workup notable for cardiomegaly with pulmonary vascular congestion and bilateral 2+ pitting edema from the feet to mid-calves. Patient unsure of history of heart failure.   - TTE suggestive of grade 1 diastolic dysfunction. normal LV/RV function. severe dilated LA.
Workup notable for cardiomegaly with pulmonary vascular congestion and bilateral 2+ pitting edema from the feet to mid-calves. Patient unsure of history of heart failure.   - TTE suggestive of grade 1 diastolic dysfunction. normal LV/RV function. severe dilated LA.  - f/u HF consult.
BP on admission 154/81. P  - C/w procardia 60mg daily as well.   - Given peripheral edema will dose for HCTZ 25 mg PO daily for now  - f/u HF consult for Echo with suggestive grade 1 diastolic dysfunction.       #BPH  - c/w home tamsulosin 0.4mg q12h
Workup notable for cardiomegaly with pulmonary vascular congestion and bilateral 2+ pitting edema from the feet to mid-calves. Patient unsure of history of heart failure.   - TTE suggestive of grade 1 diastolic dysfunction. normal LV/RV function. severe dilated LA.  - f/u HF consult recs.  - d/c hctz and start lasix 20mg PO daily.   - Rpt CXR this morning improved from admission.
Workup notable for cardiomegaly with pulmonary vascular congestion and bilateral 2+ pitting edema from the feet to mid-calves. Patient unsure of history of heart failure.   - TTE suggestive of grade 1 diastolic dysfunction. normal LV/RV function. severe dilated LA.  - f/u HF consult recs.  - d/c hctz and start lasix 20mg PO daily.   - Rpt CXR this morning improved from admission.

## 2021-06-13 NOTE — CHART NOTE - NSCHARTNOTEFT_GEN_A_CORE
Physician called to bedside by nursing for patient demanding to be able to leave due to his wife requiring hospitalization. His wife suffered a fall and was unable to stand up. He called EMS for his wife. Physician expressed empathy for the situation but clearly communicated that his leaving the hospital would be against medical advice. Physician communicated that his unsafe discharge against medical advice could result in injury due to his unsteady gate or death from fall. Mr. Maciel communicated his understanding of the risks and was deemed to have capacity at the time he left against medical advice. Medicine team will ensure that he will receive needed follow up and prescriptions in AM (6/14).

## 2021-06-13 NOTE — PROGRESS NOTE ADULT - PROBLEM SELECTOR PLAN 4
Says he is on methadone 180mg for which he receives his dose at the [facility] on [address]  - Confirmed dose with clinic (tel: [phone])
Says he is on methadone 180mg for which he receives his dose at the [facility] on [address]  - Confirmed dose with clinic (tel: [phone])
Says he is on methadone 180mg for which he receives his dose at the [facility] on [address]  - Confirmed dose with clinic (tel: [phone])
Urinalysis positive for nitrites, small LE, >10 WBC, + bacteria. Patient denies any dysuria or urethral discharge, but admits to polyuria within the past week. Denies any fever, chills, flank pain, confusion.   - growing Enterobacter cloacae in culture, switched antibiotics to bactrim BID 5 days.  - sensitive to bactrim, will continue on this course.
Says he is on methadone 180mg for which he receives his dose at the [facility] on [address].  - Confirmed dose with clinic (tel: [phone])

## 2021-06-13 NOTE — PROGRESS NOTE ADULT - PROVIDER SPECIALTY LIST ADULT
Rehab Medicine
Heart Failure
Internal Medicine
Hospitalist
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
## 2021-06-17 DIAGNOSIS — I11.0 HYPERTENSIVE HEART DISEASE WITH HEART FAILURE: ICD-10-CM

## 2021-06-17 DIAGNOSIS — F11.20 OPIOID DEPENDENCE, UNCOMPLICATED: ICD-10-CM

## 2021-06-17 DIAGNOSIS — M19.022 PRIMARY OSTEOARTHRITIS, LEFT ELBOW: ICD-10-CM

## 2021-06-17 DIAGNOSIS — N39.0 URINARY TRACT INFECTION, SITE NOT SPECIFIED: ICD-10-CM

## 2021-06-17 DIAGNOSIS — N17.9 ACUTE KIDNEY FAILURE, UNSPECIFIED: ICD-10-CM

## 2021-06-17 DIAGNOSIS — S42.492A OTHER DISPLACED FRACTURE OF LOWER END OF LEFT HUMERUS, INITIAL ENCOUNTER FOR CLOSED FRACTURE: ICD-10-CM
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
## 2021-06-17 DIAGNOSIS — Z88.0 ALLERGY STATUS TO PENICILLIN: ICD-10-CM

## 2021-06-17 DIAGNOSIS — I50.30 UNSPECIFIED DIASTOLIC (CONGESTIVE) HEART FAILURE: ICD-10-CM

## 2021-06-17 DIAGNOSIS — N40.0 BENIGN PROSTATIC HYPERPLASIA WITHOUT LOWER URINARY TRACT SYMPTOMS: ICD-10-CM

## 2021-06-17 DIAGNOSIS — D64.9 ANEMIA, UNSPECIFIED: ICD-10-CM

## 2021-06-17 DIAGNOSIS — E87.1 HYPO-OSMOLALITY AND HYPONATREMIA: ICD-10-CM

## 2021-06-17 DIAGNOSIS — B96.89 OTHER SPECIFIED BACTERIAL AGENTS AS THE CAUSE OF DISEASES CLASSIFIED ELSEWHERE: ICD-10-CM

## 2021-06-17 DIAGNOSIS — J45.909 UNSPECIFIED ASTHMA, UNCOMPLICATED: ICD-10-CM
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
## 2021-06-28 ENCOUNTER — APPOINTMENT (OUTPATIENT)
Dept: HEART AND VASCULAR | Facility: CLINIC | Age: 71
End: 2021-06-28

## 2023-02-16 NOTE — ED ADULT NURSE NOTE - OBJECTIVE STATEMENT
· HPI Objective Statement: 70 y/o M pt with PMHx of asthma, HTN, and on a Methadone program x 15yrs presents to ED s/p slip and fall in the shower tower. Pt states his L hand wound up behind his back and he fell onto his back. He currently relates pain from his L shoulder radiating down his L arm. Pt relates hitting his head, but denies LOC, neck pain, or any other complaints. Pt is not on any blood thinners. Pt is R handed.    arrives with LUE in sling, obvious deformity to lt humeral region, distal pms in-tact, denies other injury or complaint
fall precautions

## 2023-02-21 NOTE — PATIENT PROFILE ADULT - DO YOU LACK THE NECESSARY SUPPORT TO HELP YOU COPE WITH LIFE CHALLENGES?
Patient O2 dropped while using bed pan. O2 placed on 10L and non rebreathing placed for comfort during bedpan use. yes

## 2024-11-14 NOTE — PHYSICAL THERAPY INITIAL EVALUATION ADULT - PLANNED THERAPY INTERVENTIONS, PT EVAL
Quality 226: Preventive Care And Screening: Tobacco Use: Screening And Cessation Intervention: Patient screened for tobacco use and is an ex/non-smoker Detail Level: Simple bed mobility training/gait training/transfer training

## 2025-06-26 NOTE — H&P ADULT - PROBLEM SELECTOR PLAN 2
Urinalysis positive for nitrites, small LE, >10 WBC, + bacteria. Patient denies any dysuria or urethral discharge, but admits to polyuria within the past week. Denies any fever, chills, flank pain, confusion.   - s/p bactrim DS PO x1   - c/w CTX 1g daily x5 days (6/8 - ) non-distended